# Patient Record
Sex: FEMALE | Race: WHITE | NOT HISPANIC OR LATINO | Employment: FULL TIME | URBAN - METROPOLITAN AREA
[De-identification: names, ages, dates, MRNs, and addresses within clinical notes are randomized per-mention and may not be internally consistent; named-entity substitution may affect disease eponyms.]

---

## 2021-10-29 ENCOUNTER — TELEPHONE (OUTPATIENT)
Dept: SCHEDULING | Facility: IMAGING CENTER | Age: 46
End: 2021-10-29

## 2021-11-04 ENCOUNTER — OFFICE VISIT (OUTPATIENT)
Dept: MEDICAL GROUP | Facility: LAB | Age: 46
End: 2021-11-04
Payer: COMMERCIAL

## 2021-11-04 VITALS
OXYGEN SATURATION: 98 % | DIASTOLIC BLOOD PRESSURE: 68 MMHG | RESPIRATION RATE: 12 BRPM | HEIGHT: 71 IN | SYSTOLIC BLOOD PRESSURE: 108 MMHG | BODY MASS INDEX: 27.3 KG/M2 | TEMPERATURE: 97.1 F | WEIGHT: 195 LBS | HEART RATE: 77 BPM

## 2021-11-04 DIAGNOSIS — M54.50 LUMBAR PAIN: ICD-10-CM

## 2021-11-04 DIAGNOSIS — Z12.31 ENCOUNTER FOR SCREENING MAMMOGRAM FOR MALIGNANT NEOPLASM OF BREAST: ICD-10-CM

## 2021-11-04 DIAGNOSIS — E55.9 VITAMIN D DEFICIENCY: ICD-10-CM

## 2021-11-04 DIAGNOSIS — Z23 NEED FOR VACCINATION: ICD-10-CM

## 2021-11-04 DIAGNOSIS — Z13.6 ENCOUNTER FOR SCREENING FOR CARDIOVASCULAR DISORDERS: ICD-10-CM

## 2021-11-04 DIAGNOSIS — J31.0 CHRONIC RHINITIS: ICD-10-CM

## 2021-11-04 PROBLEM — J30.1 SEASONAL ALLERGIC RHINITIS DUE TO POLLEN: Status: ACTIVE | Noted: 2019-03-28

## 2021-11-04 PROCEDURE — 99204 OFFICE O/P NEW MOD 45 MIN: CPT | Mod: 25 | Performed by: FAMILY MEDICINE

## 2021-11-04 PROCEDURE — 90471 IMMUNIZATION ADMIN: CPT | Performed by: FAMILY MEDICINE

## 2021-11-04 PROCEDURE — 90686 IIV4 VACC NO PRSV 0.5 ML IM: CPT | Performed by: FAMILY MEDICINE

## 2021-11-04 RX ORDER — CETIRIZINE HYDROCHLORIDE 10 MG/1
10 TABLET ORAL DAILY
COMMUNITY
End: 2022-06-14

## 2021-11-04 RX ORDER — FLUTICASONE PROPIONATE 50 MCG
1 SPRAY, SUSPENSION (ML) NASAL DAILY
Qty: 16 G | Refills: 3 | Status: SHIPPED | OUTPATIENT
Start: 2021-11-04 | End: 2022-06-14

## 2021-11-04 ASSESSMENT — PATIENT HEALTH QUESTIONNAIRE - PHQ9: CLINICAL INTERPRETATION OF PHQ2 SCORE: 0

## 2021-11-04 NOTE — PROGRESS NOTES
Chief Complaint   Patient presents with   • New Patient     Back pain x years         Stacey Purdy is a 46 y.o. female here to establish care and for evaluation and management of:        HPI:    Back Pain:   Chronic in nature. Lots of history of athletics, volleyball. No specific injury known. Chronic over time.   No PT, no injections done in the past.     Works out regularly. Waist line, left side worse than right. Will have some change in her ADLs due to back pain. More sore after work outs. Not much coming down into the butt or legs.   With activity some cramps on the right calf due to prior achilles rupture.   Hard time getting comfortable to sleep.   No loss of bowel  Or bladder function.   NSAIDS once in awhile. CBD patches at times. Cramps in back stay in the back.     Diet: Whole foods, lower carbs.   Exercise: HIIT workouts, strength, cardio, etc.   H/o vitamin D deficiency.     LMP: very regular, clockwork, Heavier in beginning, then tapers off. Not painful or crampy.   Has been trying to get pregnant. Trying to use an shira for ovulatory cycles.   No h/o STIs in the past.   Last Pap: unknown.       Previous MRI 2019 showed:   1. L5-S1, there is severe disc space loss, asymmetric on the right   with Modic II endplate degenerative change and asymmetric osteophyte   formations. Mild to moderate broad-based disc bulge that impresses on   the anterior thecal sac causing mild spinal narrowing. Mild facet   joint degenerative changes. Moderate to severe right neural foraminal   narrowing with disc osteophyte complex abutment of the exiting L5   nerve root.     2. L4-L5, there is moderate asymmetric disc space loss on the right   with minimal Modic II endplate degenerative change and small endplate   osteophyte formations. Moderate broad-based disc bulge and posterior   endplate osteophytes that impress on the anterior thecal sac causing   mild spinal canal narrowing. Mild facet joint degenerative changes,  "  right more than left. Mild-to-moderate bilateral neural foraminal   narrowing.     3. L3-L4, there is severe disc space loss, asymmetric on the left with   mixed Modic I and II endplate degenerative changes, and asymmetric   osteophyte formations. Moderate broad-based disc bulge that flattens   the anterior thecal sac and narrows the lateral recesses. Mild to   moderate left neural foraminal narrowing. Patent right neural   foramina.     4. Mild reversal of lumbar lordosis and dextroconvex curvature        Not on File    Current medicines (including changes today)  No current outpatient medications on file.     No current facility-administered medications for this visit.     She  has no past medical history on file.  She  has no past surgical history on file.  Social History     Tobacco Use   • Smoking status: Never Smoker   • Smokeless tobacco: Never Used   Vaping Use   • Vaping Use: Never used   Substance Use Topics   • Alcohol use: Not on file     Comment: yes   • Drug use: Yes     Social History     Social History Narrative   • Not on file     No family history on file.  No family status information on file.         ROS  No fever or chills.  No nausea or vomiting.  No chest pain or palpitations.  No cough or SOB.  No pain with urination or hematuria.  No black or bloody stools.  All other systems reviewed and are negative     Objective:     /68 (BP Location: Left arm, Patient Position: Sitting, BP Cuff Size: Adult)   Pulse 77   Temp 36.2 °C (97.1 °F)   Resp 12   Ht 1.803 m (5' 11\")   Wt 88.5 kg (195 lb)   SpO2 98%  Body mass index is 27.2 kg/m².  Physical Exam:      Well developed, well nourished.  Alert, oriented in no acute distress.  Psych: Eye contact is good, speech goal directed, affect calm  Eyes: conjunctiva non-injected, sclera non-icteric.  Ears: Pinna normal. TM pearly gray.   Nose: Nares are patent.  Normal mucosa  Mouth: Oral mucous membranes pink and moist with no lesions.  Neck " Supple.  No adenopathy or masses in the neck or supraclavicular regions. No thyromegaly  Lungs: clear to auscultation bilaterally with good excursion. No wheezes or rhonchi  CV: regular rate and rhythm. No murmur  Abdomen: soft, nontender, no masses or organomegaly.  No rebound or gaurding  Ext: no edema, color normal, vascularity normal, temperature normal  MSK: Lower extremity DTRs are quite brisk but normal and equal bilaterally.  Left-sided paraspinal and SI joint tenderness as well as less mobility of the left SI joint.  Normal forward flexion with some tightness and pain but no radiation to lower extremities.  Negative seated straight leg raise      Assessment and Plan:   The following treatment plan was discussed    1. Lumbar pain  Discussed previous MRI but also current symptoms.  I do not think that she needs to be evaluated at this time for injections or surgery given her high functioning throughout the day.  I do think some physical therapy would be of benefit and she is agreeable to this.  She is referred and we will start with some conservative management for now.    2. Need for vaccination  Flu shot today.  - INFLUENZA VACCINE QUAD INJ (PF)    3. Encounter for screening mammogram for malignant neoplasm of breast  Mammogram ordered.  - MA-SCREENING MAMMO BILAT W/TOMOSYNTHESIS W/CAD; Future    4. Chronic rhinitis  Flonase for chronic rhinitis.  - fluticasone (FLONASE) 50 MCG/ACT nasal spray; Administer 1 Spray into affected nostril(S) every day.  Dispense: 16 g; Refill: 3    5. Encounter for screening for cardiovascular disorders  Routine general screening labs ordered.  We will work on getting records from her previous providers for Pap as well.  - CBC WITH DIFFERENTIAL; Future  - Lipid Profile; Future  - Comp Metabolic Panel; Future  - TSH WITH REFLEX TO FT4; Future    6. Vitamin D deficiency  Reports history of vitamin D deficiency and prescription strength dosing so we will recheck this today.  -  VITAMIN D,25 HYDROXY; Future      Records requested.        Followup: No follow-ups on file.

## 2021-11-04 NOTE — LETTER
[facility]  Malinda Villar M.D.  [address]  [place] [address]  Fax: [phone]   Authorization for Release/Disclosure of   Protected Health Information   Name: STACEY HDEZ : 1975 SSN: [id number]   Address: [place] [address] Phone:    [phone] (home)    I authorize the entity listed below to release/disclose the PHI below to:   [facility]/Malinda Villar M.D. and Malinda Villar M.D.   Provider or Entity Name:  [facility]   Address   City, State, Zip   Phone:      Fax:     Reason for request: continuity of care   Information to be released:    [  ] LAST COLONOSCOPY,  including any PATH REPORT and follow-up  [  ] LAST FIT/COLOGUARD RESULT [  ] LAST DEXA  [  ] LAST MAMMOGRAM  [  ] LAST PAP  [  ] LAST LABS [  ] RETINA EXAM REPORT  [  ] IMMUNIZATION RECORDS  [x  ] Release all info      [  ] Check here and initial the line next to each item to release ALL health information INCLUDING  __x___ Care and treatment for drug and / or alcohol abuse  __x___ HIV testing, infection status, or AIDS  __xx___ Genetic Testing    DATES OF SERVICE OR TIME PERIOD TO BE DISCLOSED: _____________  I understand and acknowledge that:  * This Authorization may be revoked at any time by you in writing, except if your health information has already been used or disclosed.  * Your health information that will be used or disclosed as a result of you signing this authorization could be re-disclosed by the recipient. If this occurs, your re-disclosed health information may no longer be protected by State or Federal laws.  * You may refuse to sign this Authorization. Your refusal will not affect your ability to obtain treatment.  * This Authorization becomes effective upon signing and will  on (date) __________.      If no date is indicated, this Authorization will  one (1) year from the signature date.    Name: Stacey Hdez    Signature:   Date:     2021        PLEASE FAX REQUESTED RECORDS BACK TO: [phone]

## 2021-12-10 ENCOUNTER — HOSPITAL ENCOUNTER (OUTPATIENT)
Dept: LAB | Facility: MEDICAL CENTER | Age: 46
End: 2021-12-10
Attending: FAMILY MEDICINE
Payer: COMMERCIAL

## 2021-12-10 DIAGNOSIS — Z13.6 ENCOUNTER FOR SCREENING FOR CARDIOVASCULAR DISORDERS: ICD-10-CM

## 2021-12-10 DIAGNOSIS — E55.9 VITAMIN D DEFICIENCY: ICD-10-CM

## 2021-12-10 LAB
ALBUMIN SERPL BCP-MCNC: 4.6 G/DL (ref 3.2–4.9)
ALBUMIN/GLOB SERPL: 1.6 G/DL
ALP SERPL-CCNC: 61 U/L (ref 30–99)
ALT SERPL-CCNC: 16 U/L (ref 2–50)
ANION GAP SERPL CALC-SCNC: 10 MMOL/L (ref 7–16)
AST SERPL-CCNC: 12 U/L (ref 12–45)
BASOPHILS # BLD AUTO: 0.9 % (ref 0–1.8)
BASOPHILS # BLD: 0.06 K/UL (ref 0–0.12)
BILIRUB SERPL-MCNC: 0.4 MG/DL (ref 0.1–1.5)
BUN SERPL-MCNC: 13 MG/DL (ref 8–22)
CALCIUM SERPL-MCNC: 9.1 MG/DL (ref 8.5–10.5)
CHLORIDE SERPL-SCNC: 104 MMOL/L (ref 96–112)
CHOLEST SERPL-MCNC: 173 MG/DL (ref 100–199)
CO2 SERPL-SCNC: 24 MMOL/L (ref 20–33)
CREAT SERPL-MCNC: 0.77 MG/DL (ref 0.5–1.4)
EOSINOPHIL # BLD AUTO: 0.23 K/UL (ref 0–0.51)
EOSINOPHIL NFR BLD: 3.4 % (ref 0–6.9)
ERYTHROCYTE [DISTWIDTH] IN BLOOD BY AUTOMATED COUNT: 42.8 FL (ref 35.9–50)
FASTING STATUS PATIENT QL REPORTED: NORMAL
GLOBULIN SER CALC-MCNC: 2.8 G/DL (ref 1.9–3.5)
GLUCOSE SERPL-MCNC: 98 MG/DL (ref 65–99)
HCT VFR BLD AUTO: 41.4 % (ref 37–47)
HDLC SERPL-MCNC: 66 MG/DL
HGB BLD-MCNC: 13.5 G/DL (ref 12–16)
IMM GRANULOCYTES # BLD AUTO: 0.02 K/UL (ref 0–0.11)
IMM GRANULOCYTES NFR BLD AUTO: 0.3 % (ref 0–0.9)
LDLC SERPL CALC-MCNC: 93 MG/DL
LYMPHOCYTES # BLD AUTO: 2.13 K/UL (ref 1–4.8)
LYMPHOCYTES NFR BLD: 31.3 % (ref 22–41)
MCH RBC QN AUTO: 29.9 PG (ref 27–33)
MCHC RBC AUTO-ENTMCNC: 32.6 G/DL (ref 33.6–35)
MCV RBC AUTO: 91.8 FL (ref 81.4–97.8)
MONOCYTES # BLD AUTO: 0.48 K/UL (ref 0–0.85)
MONOCYTES NFR BLD AUTO: 7.1 % (ref 0–13.4)
NEUTROPHILS # BLD AUTO: 3.88 K/UL (ref 2–7.15)
NEUTROPHILS NFR BLD: 57 % (ref 44–72)
NRBC # BLD AUTO: 0 K/UL
NRBC BLD-RTO: 0 /100 WBC
PLATELET # BLD AUTO: 245 K/UL (ref 164–446)
PMV BLD AUTO: 13.6 FL (ref 9–12.9)
POTASSIUM SERPL-SCNC: 4 MMOL/L (ref 3.6–5.5)
PROT SERPL-MCNC: 7.4 G/DL (ref 6–8.2)
RBC # BLD AUTO: 4.51 M/UL (ref 4.2–5.4)
SODIUM SERPL-SCNC: 138 MMOL/L (ref 135–145)
TRIGL SERPL-MCNC: 68 MG/DL (ref 0–149)
TSH SERPL DL<=0.005 MIU/L-ACNC: 1.54 UIU/ML (ref 0.38–5.33)
WBC # BLD AUTO: 6.8 K/UL (ref 4.8–10.8)

## 2021-12-10 PROCEDURE — 80053 COMPREHEN METABOLIC PANEL: CPT

## 2021-12-10 PROCEDURE — 84443 ASSAY THYROID STIM HORMONE: CPT

## 2021-12-10 PROCEDURE — 82306 VITAMIN D 25 HYDROXY: CPT

## 2021-12-10 PROCEDURE — 36415 COLL VENOUS BLD VENIPUNCTURE: CPT

## 2021-12-10 PROCEDURE — 85025 COMPLETE CBC W/AUTO DIFF WBC: CPT

## 2021-12-10 PROCEDURE — 80061 LIPID PANEL: CPT

## 2021-12-13 DIAGNOSIS — E55.9 VITAMIN D DEFICIENCY: ICD-10-CM

## 2021-12-13 LAB — 25(OH)D3 SERPL-MCNC: 17 NG/ML (ref 30–80)

## 2021-12-13 RX ORDER — ERGOCALCIFEROL 1.25 MG/1
50000 CAPSULE ORAL
Qty: 12 CAPSULE | Refills: 3 | Status: SHIPPED | OUTPATIENT
Start: 2021-12-13 | End: 2022-11-10

## 2021-12-14 ENCOUNTER — PATIENT MESSAGE (OUTPATIENT)
Dept: MEDICAL GROUP | Facility: LAB | Age: 46
End: 2021-12-14

## 2021-12-14 DIAGNOSIS — Z31.41 FERTILITY TESTING: ICD-10-CM

## 2022-01-11 ENCOUNTER — HOSPITAL ENCOUNTER (OUTPATIENT)
Dept: LAB | Facility: MEDICAL CENTER | Age: 47
End: 2022-01-11
Attending: FAMILY MEDICINE
Payer: COMMERCIAL

## 2022-01-11 ENCOUNTER — OFFICE VISIT (OUTPATIENT)
Dept: MEDICAL GROUP | Facility: LAB | Age: 47
End: 2022-01-11
Payer: COMMERCIAL

## 2022-01-11 VITALS
HEART RATE: 80 BPM | RESPIRATION RATE: 12 BRPM | BODY MASS INDEX: 27.86 KG/M2 | HEIGHT: 71 IN | SYSTOLIC BLOOD PRESSURE: 110 MMHG | DIASTOLIC BLOOD PRESSURE: 80 MMHG | WEIGHT: 199 LBS | OXYGEN SATURATION: 98 % | TEMPERATURE: 97.3 F

## 2022-01-11 DIAGNOSIS — Z32.01 POSITIVE PREGNANCY TEST: ICD-10-CM

## 2022-01-11 DIAGNOSIS — N92.6 IRREGULAR MENSES: ICD-10-CM

## 2022-01-11 LAB
B-HCG SERPL-ACNC: 857 MIU/ML (ref 0–5)
INT CON NEG: ABNORMAL
INT CON POS: ABNORMAL
POC URINE PREGNANCY TEST: POSITIVE

## 2022-01-11 PROCEDURE — 36415 COLL VENOUS BLD VENIPUNCTURE: CPT

## 2022-01-11 PROCEDURE — 84702 CHORIONIC GONADOTROPIN TEST: CPT

## 2022-01-11 PROCEDURE — 99213 OFFICE O/P EST LOW 20 MIN: CPT | Performed by: FAMILY MEDICINE

## 2022-01-11 PROCEDURE — 81025 URINE PREGNANCY TEST: CPT | Performed by: FAMILY MEDICINE

## 2022-01-11 ASSESSMENT — FIBROSIS 4 INDEX: FIB4 SCORE: 0.54

## 2022-01-11 NOTE — PROGRESS NOTES
"Subjective:     Chief Complaint   Patient presents with   • Menorrhagia     prolonged period x 4 weeks         HPI:   Stacey presents today with prolonged period for the last 3-4 weeks. Also took a pregnancy test and was positive.   Has not been phoned yet for fertility consult.   LMP prior to this stuff: 11/18/21, then spotting 12/13/21, then 24th was very heavy bleeding, then very light again, and then today normal flow, which is about a week early. Very red, mucusy. . January 9th home positive pregnancy and again positive in clinic today.           Current Outpatient Medications Ordered in Epic   Medication Sig Dispense Refill   • vitamin D2, Ergocalciferol, (DRISDOL) 1.25 MG (46860 UT) Cap capsule Take 1 Capsule by mouth every 7 days. 12 Capsule 3   • cetirizine (ZYRTEC) 10 MG Tab Take 10 mg by mouth every day.     • fluticasone (FLONASE) 50 MCG/ACT nasal spray Administer 1 Spray into affected nostril(S) every day. 16 g 3     No current [facility]-ordered facility-administered medications on file.         ROS:  Gen: no fevers/chills, no changes in weight  Eyes: no changes in vision  ENT: no sore throat, no hearing loss, no bloody nose  Pulm: no sob, no cough  CV: no chest pain, no palpitations  GI: no nausea/vomiting, no diarrhea  : no dysuria  MSk: no myalgias  Skin: no rash  Neuro: no headaches, no numbness/tingling  Heme/Lymph: no easy bruising      Objective:     Exam:  /80   Pulse 80   Temp 36.3 °C (97.3 °F) (Temporal)   Resp 12   Ht 1.803 m (5' 11\")   Wt 90.3 kg (199 lb)   LMP 12/13/2021   SpO2 98%   BMI 27.75 kg/m²  Body mass index is 27.75 kg/m².    Gen: AAOx3, NAD, well appearing  HEENT: NCAT, EOMI, Nares patent, Mucosa moist  Resp: Normal chest wall rise and fall, not SOB, no tachypnea  Skin: no rash or abnormality of visible skin.   Psych: normal speech, not slurred, good insight, affect full  MSK: Moves all four limbs equally and normally, gait normal      Assessment & Plan:     46 y.o. " female with the following -     1. Irregular menses  Discussed positive pregnancy test in clinic today and very rapidly positive on testing.  We will get some hCG quant's to follow over the next week every 3 days.  If her numbers are going up but she continues to bleed we will have to get some ultrasounds and have her see OB quickly to make sure that she does not continue to bleed for prolonged amount of time.  If her numbers are going down this was probably a pregnancy that has terminated we will need to make sure that she does again stop bleeding at some point here in the near future.  May need a D&C if this continues.  - POCT Pregnancy    2. Positive pregnancy test    - HCG QUANTITATIVE; Standing            No follow-ups on file.    Please note that this dictation was created using voice recognition software. I have made every reasonable attempt to correct obvious errors, but I expect that there are errors of grammar and possibly content that I did not discover before finalizing the note.

## 2022-01-14 ENCOUNTER — HOSPITAL ENCOUNTER (OUTPATIENT)
Dept: LAB | Facility: MEDICAL CENTER | Age: 47
End: 2022-01-14
Attending: FAMILY MEDICINE
Payer: COMMERCIAL

## 2022-01-14 DIAGNOSIS — Z32.01 POSITIVE PREGNANCY TEST: ICD-10-CM

## 2022-01-14 LAB — B-HCG SERPL-ACNC: 773 MIU/ML (ref 0–5)

## 2022-01-14 PROCEDURE — 36415 COLL VENOUS BLD VENIPUNCTURE: CPT

## 2022-01-14 PROCEDURE — 84702 CHORIONIC GONADOTROPIN TEST: CPT

## 2022-05-03 ENCOUNTER — HOSPITAL ENCOUNTER (OUTPATIENT)
Dept: LAB | Facility: MEDICAL CENTER | Age: 47
End: 2022-05-03
Attending: OBSTETRICS & GYNECOLOGY
Payer: COMMERCIAL

## 2022-05-03 LAB
HBV SURFACE AB SERPL IA-ACNC: 5690 MIU/ML (ref 0–10)
HBV SURFACE AG SER QL: NORMAL
HCV AB SER QL: NORMAL
HIV 1+2 AB+HIV1 P24 AG SERPL QL IA: NORMAL
T PALLIDUM AB SER QL IA: NORMAL

## 2022-05-03 PROCEDURE — 86780 TREPONEMA PALLIDUM: CPT

## 2022-05-03 PROCEDURE — 87340 HEPATITIS B SURFACE AG IA: CPT

## 2022-05-03 PROCEDURE — 87491 CHLMYD TRACH DNA AMP PROBE: CPT

## 2022-05-03 PROCEDURE — 87389 HIV-1 AG W/HIV-1&-2 AB AG IA: CPT

## 2022-05-03 PROCEDURE — 87591 N.GONORRHOEAE DNA AMP PROB: CPT

## 2022-05-03 PROCEDURE — 86803 HEPATITIS C AB TEST: CPT

## 2022-05-03 PROCEDURE — 36415 COLL VENOUS BLD VENIPUNCTURE: CPT

## 2022-05-03 PROCEDURE — 86706 HEP B SURFACE ANTIBODY: CPT

## 2022-05-04 LAB
C TRACH DNA SPEC QL NAA+PROBE: NEGATIVE
N GONORRHOEA DNA SPEC QL NAA+PROBE: NEGATIVE
SPECIMEN SOURCE: NORMAL

## 2022-06-14 ENCOUNTER — OFFICE VISIT (OUTPATIENT)
Dept: URGENT CARE | Facility: CLINIC | Age: 47
End: 2022-06-14
Payer: COMMERCIAL

## 2022-06-14 VITALS
WEIGHT: 187 LBS | SYSTOLIC BLOOD PRESSURE: 118 MMHG | HEART RATE: 116 BPM | DIASTOLIC BLOOD PRESSURE: 82 MMHG | TEMPERATURE: 98.5 F | BODY MASS INDEX: 26.18 KG/M2 | RESPIRATION RATE: 18 BRPM | HEIGHT: 71 IN | OXYGEN SATURATION: 99 %

## 2022-06-14 DIAGNOSIS — M47.816 SPONDYLOSIS WITHOUT MYELOPATHY OR RADICULOPATHY, LUMBAR REGION: ICD-10-CM

## 2022-06-14 DIAGNOSIS — M54.50 ACUTE BILATERAL LOW BACK PAIN WITHOUT SCIATICA: ICD-10-CM

## 2022-06-14 PROCEDURE — 99213 OFFICE O/P EST LOW 20 MIN: CPT | Performed by: PHYSICIAN ASSISTANT

## 2022-06-14 RX ORDER — DESOGESTREL AND ETHINYL ESTRADIOL 0.15-0.03
KIT ORAL
COMMUNITY
Start: 2022-04-12 | End: 2022-06-14

## 2022-06-14 RX ORDER — CYCLOBENZAPRINE HCL 10 MG
10 TABLET ORAL 3 TIMES DAILY PRN
Qty: 10 TABLET | Refills: 0 | Status: SHIPPED | OUTPATIENT
Start: 2022-06-14 | End: 2023-10-30

## 2022-06-14 RX ORDER — PREDNISONE 10 MG/1
40 TABLET ORAL 2 TIMES DAILY
Qty: 40 TABLET | Refills: 0 | Status: SHIPPED | OUTPATIENT
Start: 2022-06-14 | End: 2022-06-19

## 2022-06-14 RX ORDER — MELOXICAM 7.5 MG/1
7.5 TABLET ORAL DAILY
Qty: 5 TABLET | Refills: 0 | Status: SHIPPED | OUTPATIENT
Start: 2022-06-14 | End: 2022-06-19

## 2022-06-14 ASSESSMENT — ENCOUNTER SYMPTOMS
GASTROINTESTINAL NEGATIVE: 1
NEUROLOGICAL NEGATIVE: 1
BACK PAIN: 1

## 2022-06-14 ASSESSMENT — FIBROSIS 4 INDEX: FIB4 SCORE: 0.54

## 2022-06-14 NOTE — PROGRESS NOTES
"  Subjective:     Stacey Dupont  is a 46 y.o. female who presents for Back Pain (X4days, Hurts to lay down, hurts to sit, hurts to stand, this is something that has occurred before, left side is the worst, feels most pain at waist level 5/10 pain just standing moving says it can get 10/10 )       She presents today with lumbosacral pain that has been ongoing for the last 4 days.  Symptoms did start after she attempted to lift her garage door.  At this time she denies radicular symptoms, bowel or bladder function loss, changes in strength or neurological sensation over the lower extremities.  She has longstanding history of lumbar pain.  An MRI of the lumbar spine that was taken several years ago in [place] did reveal several degenerative changes at the lower lumbar spine.  She has trialed over-the-counter anti-inflammatory medication without any relief in symptoms.  She is scheduled to follow-up with her primary care in 2 days.     Review of Systems   Gastrointestinal: Negative.    Genitourinary: Negative.    Musculoskeletal: Positive for back pain.   Neurological: Negative.       No Known Allergies  Past Medical History:   Diagnosis Date   • Back pain         Objective:   /82   Pulse (!) 116   Temp 36.9 °C (98.5 °F) (Temporal)   Resp 18   Ht 1.803 m (5' 11\")   Wt 84.8 kg (187 lb)   LMP 06/12/2022 (Exact Date)   SpO2 99%   Breastfeeding No   BMI 26.08 kg/m²   Physical Exam  Vitals and nursing note reviewed.   Constitutional:       General: She is not in acute distress.     Appearance: She is not ill-appearing or toxic-appearing.   HENT:      Head: Normocephalic.      Nose: No rhinorrhea.   Eyes:      General: No scleral icterus.     Conjunctiva/sclera: Conjunctivae normal.   Pulmonary:      Effort: Pulmonary effort is normal. No respiratory distress.      Breath sounds: No stridor.   Musculoskeletal:      Cervical back: Neck supple.      Comments: Tenderness to palpation over the lumbosacral bony " prominences and paraspinal musculature.  Significantly limited trunk range of motion due to pain.  Lower extremity myotome and dermatome exams were within normal limits, bilaterally   Neurological:      Mental Status: She is alert and oriented to person, place, and time.   Psychiatric:         Mood and Affect: Mood normal.         Behavior: Behavior normal.         Thought Content: Thought content normal.         Judgment: Judgment normal.             Diagnostic testing: None    Assessment/Plan:     Encounter Diagnoses   Name Primary?   • Spondylosis without myelopathy or radiculopathy, lumbar region    • Acute bilateral low back pain without sciatica         Plan for care for today's complaint includes Flexeril, Mobic, prednisone.  She should follow-up with her PCP as scheduled in 2 days for further evaluation management of her ongoing low back pain.  If she develops quickly progressing lower extremity weakness, loss of lower extremity sensation, loss of bowel or bladder function then she should return to the emergency department at that time for reevaluation.  Prescription for Flexeril, Mobic, prednisone provided.    See AVS Instructions below for written guidance provided to patient on after-visit management and care in addition to our verbal discussion during the visit.    Please note that this dictation was created using voice recognition software. I have attempted to correct all errors, but there may be sound-alike, spelling, grammar and possibly content errors that I did not discover before finalizing the note.    Dante Bains PA-C

## 2022-06-16 ENCOUNTER — OFFICE VISIT (OUTPATIENT)
Dept: MEDICAL GROUP | Facility: LAB | Age: 47
End: 2022-06-16
Payer: COMMERCIAL

## 2022-06-16 VITALS
WEIGHT: 190 LBS | TEMPERATURE: 97 F | DIASTOLIC BLOOD PRESSURE: 70 MMHG | OXYGEN SATURATION: 98 % | HEART RATE: 77 BPM | RESPIRATION RATE: 12 BRPM | HEIGHT: 71 IN | BODY MASS INDEX: 26.6 KG/M2 | SYSTOLIC BLOOD PRESSURE: 100 MMHG

## 2022-06-16 DIAGNOSIS — M47.816 LUMBAR SPONDYLOSIS: ICD-10-CM

## 2022-06-16 PROCEDURE — 99213 OFFICE O/P EST LOW 20 MIN: CPT | Performed by: FAMILY MEDICINE

## 2022-06-16 ASSESSMENT — FIBROSIS 4 INDEX: FIB4 SCORE: 0.54

## 2022-06-16 ASSESSMENT — PATIENT HEALTH QUESTIONNAIRE - PHQ9: CLINICAL INTERPRETATION OF PHQ2 SCORE: 0

## 2022-06-16 NOTE — PROGRESS NOTES
"Subjective:     Chief Complaint   Patient presents with   • Back Pain     X friday         HPI:   Stacey presents today with flare up of back pain. Went to urgent care and was given flexeril, prednisone, mobic.   Feeling better now, but needs new PT referral and possibly imaging.   Forward flexion causes most pain. Left more than right.       COVID vaccines: Chest reports that she has been completely vaccinated for COVID but all of her vaccinations are in her maiden name.  She wonders if we can certify that she has had these and get her new vaccination card as well.    Current Outpatient Medications Ordered in Epic   Medication Sig Dispense Refill   • predniSONE (DELTASONE) 10 MG Tab Take 4 Tablets by mouth 2 times a day for 5 days. 40 Tablet 0   • meloxicam (MOBIC) 7.5 MG Tab Take 1 Tablet by mouth every day for 5 days. 5 Tablet 0   • cyclobenzaprine (FLEXERIL) 10 mg Tab Take 1 Tablet by mouth 3 times a day as needed for Muscle Spasms. 10 Tablet 0   • vitamin D2, Ergocalciferol, (DRISDOL) 1.25 MG (58265 UT) Cap capsule Take 1 Capsule by mouth every 7 days. 12 Capsule 3     No current [facility]-ordered facility-administered medications on file.         ROS:  Gen: no fevers/chills, no changes in weight  Eyes: no changes in vision  ENT: no sore throat, no hearing loss, no bloody nose  Pulm: no sob, no cough  CV: no chest pain, no palpitations  GI: no nausea/vomiting, no diarrhea  : no dysuria  MSk: no myalgias  Skin: no rash  Neuro: no headaches, no numbness/tingling  Heme/Lymph: no easy bruising      Objective:     Exam:  /70 (BP Location: Left arm, Patient Position: Sitting, BP Cuff Size: Adult)   Pulse 77   Temp 36.1 °C (97 °F)   Resp 12   Ht 1.803 m (5' 11\")   Wt 86.2 kg (190 lb)   LMP 06/12/2022 (Exact Date)   SpO2 98%   BMI 26.50 kg/m²  Body mass index is 26.5 kg/m².    Gen: AAOx3, NAD, well appearing  HEENT: NCAT, EOMI, Nares patent, Mucosa moist  Resp: Normal chest wall rise and fall, not SOB, no " tachypnea  Skin: no rash or abnormality of visible skin.   Psych: normal speech, not slurred, good insight, affect full  MSK: Moves all four limbs equally and normally, gait normal.  She is moving very gingerly in general from the exam table to standing.  She has pain at the L3-4 area particular on the left side.  There is no SI joint tenderness appreciated today.  Very poor forward flexion range of motion likely due to apprehension.  Does have some mild paraspinal hypertonicity bilaterally in the lumbar spine.  Forward flexion causes a rightward lean and some mild rotation as well.  DTRs are equal and normal bilaterally to lower extremities.        Assessment & Plan:     46 y.o. female with the following -   1. Lumbar spondylosis  Reviewed MRI from previous records in 2019 through [facility].  Extensive lower lumbar spine spondylosis particularly worse at 3-4,4-5, 5-1.  Since she is not having any radicular symptoms at this time I think we can continue to work with physical therapy to see how much we can strengthen her core and her paraspinals to see much pain relief she gets.  We did discuss possibility of repeating MRI in the future depending upon how her therapy goes.  If she continues to not have any pain relief or continues to have multiple flareups even without radicular symptoms I think it be worth it given the findings on her previous MRI to repeat this.    With regard to her COVID vaccination we can certify that she has had this done by getting her old card (with her new information and getting her new vaccination record from her chart.  - Referral to Physical Therapy            No follow-ups on file.    Please note that this dictation was created using voice recognition software. I have made every reasonable attempt to correct obvious errors, but I expect that there are errors of grammar and possibly content that I did not discover before finalizing the note.

## 2022-06-27 ENCOUNTER — TELEPHONE (OUTPATIENT)
Dept: MEDICAL GROUP | Facility: LAB | Age: 47
End: 2022-06-27
Payer: COMMERCIAL

## 2022-06-27 NOTE — TELEPHONE ENCOUNTER
"· Physical Therapy paperwork received from [facility] requiring provider signature.     · All appropriate fields completed by Medical Assistant: N/A CMA printed and distributed to MA    · Paperwork placed in \"MA to Provider\" folder/basket. Awaiting provider completion/signature.  "

## 2022-09-09 ENCOUNTER — TELEPHONE (OUTPATIENT)
Dept: MEDICAL GROUP | Facility: LAB | Age: 47
End: 2022-09-09
Payer: COMMERCIAL

## 2022-09-09 NOTE — TELEPHONE ENCOUNTER
"Physical Therapy paperwork received from [facility] requiring provider signature.     All appropriate fields completed by Medical Assistant: N/A CMA printed and distributed to MA    Paperwork placed in \"MA to Provider\" folder/basket. Awaiting provider completion/signature.    "

## 2022-10-14 ENCOUNTER — TELEPHONE (OUTPATIENT)
Dept: MEDICAL GROUP | Facility: LAB | Age: 47
End: 2022-10-14
Payer: COMMERCIAL

## 2022-11-09 DIAGNOSIS — E55.9 VITAMIN D DEFICIENCY: ICD-10-CM

## 2022-11-09 NOTE — TELEPHONE ENCOUNTER
Received request via: Pharmacy    Was the patient seen in the last year in this department? Yes  6/16/22  Does the patient have an active prescription (recently filled or refills available) for medication(s) requested? No

## 2022-11-10 RX ORDER — ERGOCALCIFEROL 1.25 MG/1
CAPSULE ORAL
Qty: 12 CAPSULE | Refills: 3 | Status: SHIPPED | OUTPATIENT
Start: 2022-11-10 | End: 2023-10-25

## 2022-11-17 ENCOUNTER — OFFICE VISIT (OUTPATIENT)
Dept: URGENT CARE | Facility: CLINIC | Age: 47
End: 2022-11-17
Payer: COMMERCIAL

## 2022-11-17 ENCOUNTER — PATIENT MESSAGE (OUTPATIENT)
Dept: MEDICAL GROUP | Facility: LAB | Age: 47
End: 2022-11-17

## 2022-11-17 ENCOUNTER — HOSPITAL ENCOUNTER (EMERGENCY)
Facility: MEDICAL CENTER | Age: 47
End: 2022-11-17
Attending: EMERGENCY MEDICINE
Payer: COMMERCIAL

## 2022-11-17 VITALS
WEIGHT: 190 LBS | RESPIRATION RATE: 20 BRPM | SYSTOLIC BLOOD PRESSURE: 104 MMHG | HEIGHT: 71 IN | OXYGEN SATURATION: 97 % | HEART RATE: 96 BPM | TEMPERATURE: 97.6 F | BODY MASS INDEX: 26.6 KG/M2 | DIASTOLIC BLOOD PRESSURE: 88 MMHG

## 2022-11-17 VITALS
BODY MASS INDEX: 27.53 KG/M2 | TEMPERATURE: 98 F | RESPIRATION RATE: 16 BRPM | OXYGEN SATURATION: 98 % | DIASTOLIC BLOOD PRESSURE: 89 MMHG | WEIGHT: 196.65 LBS | HEART RATE: 89 BPM | HEIGHT: 71 IN | SYSTOLIC BLOOD PRESSURE: 121 MMHG

## 2022-11-17 DIAGNOSIS — M54.50 LUMBAR BACK PAIN: ICD-10-CM

## 2022-11-17 DIAGNOSIS — M47.816 LUMBAR SPONDYLOSIS: ICD-10-CM

## 2022-11-17 DIAGNOSIS — M54.50 ACUTE BILATERAL LOW BACK PAIN WITHOUT SCIATICA: ICD-10-CM

## 2022-11-17 LAB — HCG SERPL QL: NEGATIVE

## 2022-11-17 PROCEDURE — 99213 OFFICE O/P EST LOW 20 MIN: CPT | Performed by: NURSE PRACTITIONER

## 2022-11-17 PROCEDURE — 700102 HCHG RX REV CODE 250 W/ 637 OVERRIDE(OP): Performed by: EMERGENCY MEDICINE

## 2022-11-17 PROCEDURE — 84703 CHORIONIC GONADOTROPIN ASSAY: CPT

## 2022-11-17 PROCEDURE — 99284 EMERGENCY DEPT VISIT MOD MDM: CPT

## 2022-11-17 PROCEDURE — 96374 THER/PROPH/DIAG INJ IV PUSH: CPT

## 2022-11-17 PROCEDURE — 36415 COLL VENOUS BLD VENIPUNCTURE: CPT

## 2022-11-17 PROCEDURE — 96375 TX/PRO/DX INJ NEW DRUG ADDON: CPT

## 2022-11-17 PROCEDURE — A9270 NON-COVERED ITEM OR SERVICE: HCPCS | Performed by: EMERGENCY MEDICINE

## 2022-11-17 PROCEDURE — 700111 HCHG RX REV CODE 636 W/ 250 OVERRIDE (IP): Performed by: EMERGENCY MEDICINE

## 2022-11-17 RX ORDER — LIDOCAINE 50 MG/G
1 PATCH TOPICAL
Qty: 10 PATCH | Refills: 1 | Status: SHIPPED | OUTPATIENT
Start: 2022-11-17 | End: 2023-10-30

## 2022-11-17 RX ORDER — ONDANSETRON 2 MG/ML
4 INJECTION INTRAMUSCULAR; INTRAVENOUS ONCE
Status: COMPLETED | OUTPATIENT
Start: 2022-11-17 | End: 2022-11-17

## 2022-11-17 RX ORDER — KETOROLAC TROMETHAMINE 30 MG/ML
30 INJECTION, SOLUTION INTRAMUSCULAR; INTRAVENOUS ONCE
Status: COMPLETED | OUTPATIENT
Start: 2022-11-17 | End: 2022-11-17

## 2022-11-17 RX ORDER — MORPHINE SULFATE 4 MG/ML
4 INJECTION INTRAVENOUS ONCE
Status: COMPLETED | OUTPATIENT
Start: 2022-11-17 | End: 2022-11-17

## 2022-11-17 RX ORDER — HYDROCODONE BITARTRATE AND ACETAMINOPHEN 5; 325 MG/1; MG/1
1 TABLET ORAL EVERY 6 HOURS PRN
Qty: 15 TABLET | Refills: 0 | Status: SHIPPED | OUTPATIENT
Start: 2022-11-17 | End: 2022-11-22

## 2022-11-17 RX ORDER — HYDROCODONE BITARTRATE AND ACETAMINOPHEN 5; 325 MG/1; MG/1
1 TABLET ORAL ONCE
Status: COMPLETED | OUTPATIENT
Start: 2022-11-17 | End: 2022-11-17

## 2022-11-17 RX ORDER — CYCLOBENZAPRINE HCL 10 MG
10 TABLET ORAL 3 TIMES DAILY PRN
Qty: 30 TABLET | Refills: 0 | Status: SHIPPED | OUTPATIENT
Start: 2022-11-17 | End: 2023-10-30

## 2022-11-17 RX ORDER — METHYLPREDNISOLONE 4 MG/1
TABLET ORAL
Qty: 1 EACH | Refills: 0 | Status: SHIPPED | OUTPATIENT
Start: 2022-11-17 | End: 2023-10-30

## 2022-11-17 RX ADMIN — MORPHINE SULFATE 4 MG: 4 INJECTION INTRAVENOUS at 14:21

## 2022-11-17 RX ADMIN — KETOROLAC TROMETHAMINE 30 MG: 30 INJECTION, SOLUTION INTRAMUSCULAR; INTRAVENOUS at 14:23

## 2022-11-17 RX ADMIN — ONDANSETRON 4 MG: 2 INJECTION INTRAMUSCULAR; INTRAVENOUS at 14:21

## 2022-11-17 RX ADMIN — HYDROCODONE BITARTRATE AND ACETAMINOPHEN 1 TABLET: 5; 325 TABLET ORAL at 16:03

## 2022-11-17 ASSESSMENT — LIFESTYLE VARIABLES
HAVE YOU EVER FELT YOU SHOULD CUT DOWN ON YOUR DRINKING: NO
CONSUMPTION TOTAL: NEGATIVE
TOTAL SCORE: 0
AVERAGE NUMBER OF DAYS PER WEEK YOU HAVE A DRINK CONTAINING ALCOHOL: 1
ON A TYPICAL DAY WHEN YOU DRINK ALCOHOL HOW MANY DRINKS DO YOU HAVE: 1
TOTAL SCORE: 0
HAVE PEOPLE ANNOYED YOU BY CRITICIZING YOUR DRINKING: NO
DO YOU DRINK ALCOHOL: YES
TOTAL SCORE: 0
EVER FELT BAD OR GUILTY ABOUT YOUR DRINKING: NO
HOW MANY TIMES IN THE PAST YEAR HAVE YOU HAD 5 OR MORE DRINKS IN A DAY: 0
EVER HAD A DRINK FIRST THING IN THE MORNING TO STEADY YOUR NERVES TO GET RID OF A HANGOVER: NO

## 2022-11-17 ASSESSMENT — FIBROSIS 4 INDEX
FIB4 SCORE: 0.55
FIB4 SCORE: 0.55

## 2022-11-17 ASSESSMENT — PAIN DESCRIPTION - PAIN TYPE: TYPE: ACUTE PAIN

## 2022-11-17 NOTE — ED TRIAGE NOTES
Chief Complaint   Patient presents with    Back Pain     Low back pain since March, she has been gong to PT nothing is helping, she feels things are worse.

## 2022-11-17 NOTE — ED TRIAGE NOTES
Patient presents to triage in a wheelchair, low back pain, bilateral lower extremity numbness and tingling for one week.

## 2022-11-17 NOTE — PROGRESS NOTES
"Patient has consented to treatment and for use of patient information for treatment and billing purposes.    Date: 11/17/22     Arrival Mode: Private Vehicle    Chief Complaint:    Chief Complaint   Patient presents with    Back Pain     Started last night, \"Lower back pain/spasms . I have degenerate disc disease. \"        History of Present Illness: 47 y.o.  female presents to clinic with reported severe low back pain that started last night.  Patient does have a long history of low back pain although states this is the worst episode she has had.  States she is unable to ambulate. Patient was unable to get out of bed unassisted this am. Patient has taken a dose of prednisone from a previous episode, flexeril, aleve and a topical NSAID path.  Patient report numbness and tingling bilateral lower back radiating down into her legs.  Patient did have an episode of diarrhea, no bloody or black diarrhea.     Patient denies nausea vomiting.  Denies burning with urination flank pain or abdominal pain. Denies cough, shortness of breath or lower leg swelling. Patient denies direct trauma, new urinary retention, fecal incontinence , no history of cancer , IV drug use, fevers, or recent surgical procedures. Patient denies ripping sensation in back or history of AAA, coldness or color change in  extremity.     ROS:    As stated in HPI     Pertinent Medical History:  Past Medical History:   Diagnosis Date    Back pain         Pertinent Surgical History:  Past Surgical History:   Procedure Laterality Date    APPENDECTOMY  2007    ACHILLES TENDON REPAIR          Pertinent Medications:    Current Outpatient Medications on File Prior to Visit   Medication Sig Dispense Refill    vitamin D2, Ergocalciferol, (DRISDOL) 1.25 MG (78745 UT) Cap capsule TAKE 1 CAPSULE BY MOUTH ONE TIME PER WEEK 12 Capsule 3    cyclobenzaprine (FLEXERIL) 10 mg Tab Take 1 Tablet by mouth 3 times a day as needed for Muscle Spasms. 10 Tablet 0     No current " facility-administered medications on file prior to visit.        Allergies:    Patient has no known allergies.     Social History:  Social History     Tobacco Use    Smoking status: Never    Smokeless tobacco: Never   Vaping Use    Vaping Use: Never used   Substance Use Topics    Alcohol use: Not Currently     Alcohol/week: 1.2 oz     Types: 2 Glasses of wine per week     Comment: yes    Drug use: Not Currently     Types: Oral     Comment: topical as well         Patient's last menstrual period was 10/19/2022 (exact date).           Physical Exam:    Vitals:    11/17/22 1005   BP: 104/88   Pulse: 96   Resp: 20   Temp: 36.4 °C (97.6 °F)   SpO2: 97%             Physical Exam  Constitutional:       Appearance: She is ill-appearing (pale,  agrees).      Comments: Patient is standing upon me entering the exam room as she is switching her gait from 1 foot to the other.  States she is unable to sit or lay down.   HENT:      Head: Normocephalic and atraumatic.   Eyes:      General: Lids are normal.      Pupils: Pupils are equal, round, and reactive to light.   Musculoskeletal:      Lumbar back: Spasms, tenderness and bony tenderness (no bridget tenderness or step off) present. Decreased range of motion.      Comments: Exam is limited due to pain.    Skin:     General: Skin is warm.      Capillary Refill: Capillary refill takes less than 2 seconds.      Coloration: Skin is pale. Skin is not cyanotic.   Neurological:      Mental Status: She is alert and oriented to person, place, and time.      Gait: Gait abnormal (antalgic).      Comments: Exam limited due to pain   Psychiatric:         Behavior: Behavior is cooperative.          Diagnostics:  None     Medical Decision making and clinic course :  I personally reviewed prior external notes and test results pertinent to today's visit.   Shared decision-making was utilized with patient for treatment plan.  Due to pain exam is limited.  Concerns with new numbness and  tingling progressive and bilaterally, progressive worsening pain, usual measures not improving as well as being unable to ambulate or get out of bed on her own.  Unfortunately, unable to give in clinic Toradol as patient has already taken NSAIDs.  Due to worsening symptoms recommend the patient 6 higher level care for pain management and possible further diagnostic work-up.      The patient remained stable during the urgent care visit.    Plan:         1. Acute bilateral low back pain without sciatica      Disposition:    Higher level of care via private care driven by .       Voice Recognition Disclaimer:  Portions of this document were created using voice recognition software. The software does have a chance of producing errors of grammar and possibly content. I have made every reasonable attempt to correct obvious errors, but there may be errors of grammar and possibly content that I did not discover before finalizing the documentation.    Kristen Taylor, [email or web address].

## 2022-11-17 NOTE — ED PROVIDER NOTES
"ED Provider Note    CHIEF COMPLAINT  Chief Complaint   Patient presents with    Back Pain     Low back pain since March, she has been gong to PT nothing is helping, she feels things are worse.       HPI  Stacey Dupont is a 47 y.o. female who presents complaining of low back pain.    Patient states she has been having low back pain since March.  She believes that pulling a garage door open may have started her back pain issues.  She has been going to physical therapy and generally been doing well.  She states Flexeril typically helps her pain.  However, at 11 PM last night she developed worsening/\"extreme\" low back pain with spasm radiating into the bilateral calf area.  Patient reports spasms on the right calf typically but now has been on the left as well.  The pain increases with movement.  She feels like her pain is a sensation of \"locking.\"  She took Aleve at 3 AM, applied Voltaren at 5 AM, Flexeril at 9 AM, and prednisone 40 mg from leftover prednisone in the past without relief.    Patient had an MRI in October 2019 which demonstrated disc issues.    Patient denies weakness, incontinence, urinary symptoms, saddle anesthesia, fever, chills, trauma.      ALLERGIES  Allergies   Allergen Reactions    Cats Claw, Uncaria Tomentosa        CURRENT MEDICATIONS  Home Medications       Reviewed by Laina Lentz R.N. (Registered Nurse) on 11/17/22 at 1141  Med List Status: Not Addressed     Medication Last Dose Status   cyclobenzaprine (FLEXERIL) 10 mg Tab  Active   vitamin D2, Ergocalciferol, (DRISDOL) 1.25 MG (19036 UT) Cap capsule  Active                    PAST MEDICAL HISTORY   has a past medical history of Back pain.    SURGICAL HISTORY   has a past surgical history that includes achilles tendon repair and appendectomy (2007).    SOCIAL HISTORY  Social History     Tobacco Use    Smoking status: Never    Smokeless tobacco: Never   Vaping Use    Vaping Use: Never used   Substance and Sexual Activity    Alcohol use: " "Not Currently     Alcohol/week: 1.2 oz     Types: 2 Glasses of wine per week     Comment: yes    Drug use: Not Currently     Types: Oral     Comment: topical as well     Sexual activity: Yes     Partners: Male     Comment: tryign to get pregnant       Patient here with her male significant other  Patient is a former [employer]   Denies IV drug use    REVIEW OF SYSTEMS  See HPI for further details.  All other systems are negative except as above in HPI.    PHYSICAL EXAM  VITAL SIGNS: BP (!) 123/95   Pulse 97   Temp 36.2 °C (97.2 °F) (Temporal)   Resp 16   Ht 1.803 m (5' 11\")   Wt 89.2 kg (196 lb 10.4 oz)   LMP 10/19/2022 (Exact Date)   SpO2 100%   BMI 27.43 kg/m²     General:  WDWN female, nontoxic but appearing uncomfortable, sitting without movement on the side of the stretcher; A+Ox3; V/S as above  Skin: warm and dry; good color; no rash  HEENT: NCAT; EOMs intact; PERRL; no scleral icterus   Neck: FROM; spasm in the bilateral upper paraspinal region;  Cardiovascular: Regular heart rate and rhythm.  No murmurs, rubs, or gallops; pulses 2+ bilaterally radially  Lungs: Clear to auscultation with good air movement bilaterally.  No wheezes, rhonchi, or rales.   Abdomen: BS present; soft; NTND; no rebound, guarding, or rigidity.  No organomegaly or pulsatile mass; no CVAT  Back: No midline back tenderness, discoloration, or mottling; negative straight leg raise bilaterally  Extremities: DIAMOND x 4; no e/o trauma; no pedal edema; neg Woodrow's  Neurologic: CNs III-XII grossly intact; speech clear; distal sensation intact; strength 5/5 UE/LEs; DTRs 2+ bilaterally in patellar areas  Psychiatric: Appropriate affect, normal mood    LABS  Results for orders placed or performed during the hospital encounter of 11/17/22   BETA-HCG QUALITATIVE SERUM   Result Value Ref Range    Beta-Hcg Qualitative Serum Negative Negative       MEDICAL RECORD  I have reviewed patient's medical record and pertinent results " are listed below.      COURSE & MEDICAL DECISION MAKING  I have reviewed any medical record information, laboratory studies and radiographic results as noted.    Stacey Dupont is a 47 y.o. female who presents complaining of bilateral low back pain radiating into her calves.  No focal neurologic deficits are reported.  She does have some tingling in her calves but no weakness, saddle anesthesia, or incontinence.  No trauma is reported.  Last MRI in 2019.  Patient appears uncomfortable and will require some parenteral pain medication.  She has been trying to get pregnant so we obtained an hCG which was negative.  Toradol and morphine with Zofran were ordered.    I do not feel she requires a stat MRI as I do not suspect epidural abscess or cauda equina syndrome.  Other imaging such as CT or x-ray would not be helpful as there has been no trauma.  Labs other than the pregnancy test were not indicated.  I do not suspect osteomyelitis or pyelonephritis.    Appropriate PPE was worn at all times while interacting with the patient.    Pt was re-evaluated at 4:00 PM  Patient's pain is improved.  She appears more comfortable, now able to extend her legs out on the stretcher without holding onto the armrests.  We discussed the plan and they are amenable to going home.  I will prescribe Flexeril, Norco, Lidoderm patches, and a Medrol Dosepak.  They are in close contact with the patient's PCP who can order an MRI as an outpatient if needed.  Apparently, there is been a referral to a spine/pain doctor by the PCP.       I have reviewed the patient's narcotic Rx record.  No prior prescriptions for controlled substances were noted.      IMPRESSION  1. Lumbar back pain  cyclobenzaprine (FLEXERIL) 10 mg Tab    HYDROcodone-acetaminophen (NORCO) 5-325 MG Tab per tablet    lidocaine (LIDODERM) 5 % Patch    methylPREDNISolone (MEDROL DOSEPAK) 4 MG Tablet Therapy Pack               Electronically signed by: Jeannette Schwarz M.D.,  11/17/2022 12:14 PM

## 2022-11-18 NOTE — DISCHARGE INSTRUCTIONS
Apply Lidoderm patches, take Tylenol 650 mg every 4 hours, ibuprofen 600 mg every 6 hours with food as needed for pain.      Take Norco as needed for more severe pain that is not controlled by the above measures.    You are being prescribed a narcotic. Narcotics are addictive. Please take the narcotic sparingly and only as needed for pain. Do not drink alcohol, operate heavy machinery, or drive while taking a narcotic as it may impair your judgment and motor skills. If the narcotic contains acetaminophen, you should not take other acetaminophen-containing products, such as Tylenol, while taking this medication as it may affect your liver.  Also, taking a stool softener while taking narcotics is advised as they cause constipation.      Return to the ER for incontinence, fever, weakness, or other concerns.

## 2022-12-01 ENCOUNTER — OFFICE VISIT (OUTPATIENT)
Dept: PHYSICAL MEDICINE AND REHAB | Facility: MEDICAL CENTER | Age: 47
End: 2022-12-01
Payer: COMMERCIAL

## 2022-12-01 VITALS
SYSTOLIC BLOOD PRESSURE: 128 MMHG | WEIGHT: 196.87 LBS | OXYGEN SATURATION: 94 % | DIASTOLIC BLOOD PRESSURE: 92 MMHG | BODY MASS INDEX: 27.56 KG/M2 | HEIGHT: 71 IN | HEART RATE: 90 BPM | TEMPERATURE: 97.3 F

## 2022-12-01 DIAGNOSIS — G89.29 OTHER CHRONIC PAIN: ICD-10-CM

## 2022-12-01 DIAGNOSIS — M48.061 NEUROFORAMINAL STENOSIS OF LUMBAR SPINE: ICD-10-CM

## 2022-12-01 DIAGNOSIS — M54.50 CHRONIC BILATERAL LOW BACK PAIN WITHOUT SCIATICA: Primary | ICD-10-CM

## 2022-12-01 DIAGNOSIS — G89.29 CHRONIC BILATERAL LOW BACK PAIN WITHOUT SCIATICA: Primary | ICD-10-CM

## 2022-12-01 DIAGNOSIS — M47.816 LUMBAR SPONDYLOSIS: ICD-10-CM

## 2022-12-01 DIAGNOSIS — Z13.31 POSITIVE DEPRESSION SCREENING: ICD-10-CM

## 2022-12-01 PROCEDURE — 99204 OFFICE O/P NEW MOD 45 MIN: CPT | Performed by: STUDENT IN AN ORGANIZED HEALTH CARE EDUCATION/TRAINING PROGRAM

## 2022-12-01 RX ORDER — ACETAMINOPHEN 325 MG/1
325 TABLET ORAL EVERY 4 HOURS PRN
COMMUNITY
End: 2023-10-30

## 2022-12-01 ASSESSMENT — PAIN SCALES - GENERAL: PAINLEVEL: 3=SLIGHT PAIN

## 2022-12-01 ASSESSMENT — PATIENT HEALTH QUESTIONNAIRE - PHQ9
5. POOR APPETITE OR OVEREATING: 1 - SEVERAL DAYS
SUM OF ALL RESPONSES TO PHQ QUESTIONS 1-9: 11
CLINICAL INTERPRETATION OF PHQ2 SCORE: 2

## 2022-12-01 ASSESSMENT — FIBROSIS 4 INDEX: FIB4 SCORE: 0.55

## 2022-12-01 NOTE — PROGRESS NOTES
New Patient Note    Interventional Pain and Spine  Physiatry (Physical Medicine and Rehabilitation)     Patient Name: Stacey Dupont  : 1975  Date of Service: 2022  PCP: Malinda Rose M.D.  Referring Provider: Kaiden Rose*    Chief Complaint:   Chief Complaint   Patient presents with    New Patient     Lumbar spondylosis       HPI  HISTORY (2022):  Stacey Dupont is a 47 y.o. female who presents today with multiple years of low back pain that started with no known inciting event. Occasionally she has extreme flares of pain worse with lying down.  Originally she was only having these epoisdes a few times per year but they have been becoming more frequent, now every other month. Her most recent episode of extreme pain was 2 weeks ago and she had radiating pain to her bilateral groin and some tingling in her right thigh. She gets spasms that interfere with her ability to move.  She is unable to identify a cause of her episodes.    Pain right now is 3/10 on the numeric pain scale. Her pain at its best-worse level during the course of the day is typically 4-7/10, respectively.  Pain worsens with  transitioning to lying supine and extending her legs  and improves with  being active . Her pain interferes somewhat with ADLs. The patient endorsed transient sensation of tingling at her right thigh when she had her recent severe exacerbation of low back pain.  She denies focal weakness or numbness or tingling elsewhere.     She reports the more active she is the better she feels. But PT interferes with ability to be active.     The patient is currently doing physical therapy for this problem, but notes this is worsening her pain. Has done PT 2 days per week since 2022 with no imprvoement. Goes to [facility] in [place]    Patient has tried the following medications with varied success (current meds in bold):   - would like to avoid meds if possible  - medrol dose  latrice - no improvement  - flexeril PRN less than daily- some improvement, SE sleepiness    Therapeutic modalities and interventional therapies to date include:  -no injections    Medical history includes MI 27.    Psychological testing for pain as depression and pain commonly coexist and need to both be treated.     Opioid Risk Score: 1      Interpretation of Opioid Risk Score   Score 0-3 = Low risk of abuse. Do UDS at least once per year.  Score 4-7 = Moderate risk of abuse. Do UDS 1-4 times per year.  Score 8+ = High risk of abuse. Refer to specialist.    PHQ      11/4/2021     9:00 AM 6/16/2022     2:00 PM 12/1/2022     9:40 AM   Depression Screen (PHQ-2/PHQ-9)   PHQ-2 Total Score 0 0 2   PHQ-9 Total Score   11       Interpretation of PHQ-9 Total Score   Score Severity   1-4 No Depression   5-9 Mild Depression   10-14 Moderate Depression   15-19 Moderately Severe Depression   20-27 Severe Depression      Medical records review:  I reviewed the notes from the ED and family care dated 11/17/22    ROS:   Red Flags ROS:   Fever, Chills, Sweats: Denies  Involuntary Weight Loss: Denies  Bladder Incontinence: Denies  Bowel Incontinence: denies  Saddle Anesthesia: Denies    All other systems reviewed and negative.     PMHx:   Past Medical History:   Diagnosis Date    Back pain        PSHx:   Past Surgical History:   Procedure Laterality Date    APPENDECTOMY  2007    ACHILLES TENDON REPAIR         Family Hx:   Family History   Problem Relation Age of Onset    Thyroid Mother     Heart Disease Father         CABG x 5    Diabetes Father         prediabetes    Heart Disease Paternal Grandfather     Diabetes Paternal Grandfather     Stroke Paternal Grandfather     Cancer Neg Hx        Social Hx:  Social History     Socioeconomic History    Marital status:      Spouse name: Not on file    Number of children: Not on file    Years of education: Not on file    Highest education level: Not on file   Occupational History    Not  on file   Tobacco Use    Smoking status: Never    Smokeless tobacco: Never   Vaping Use    Vaping Use: Never used   Substance and Sexual Activity    Alcohol use: Yes     Alcohol/week: 1.2 oz     Types: 2 Glasses of wine per week    Drug use: Not Currently     Types: Oral     Comment: topical as well     Sexual activity: Yes     Partners: Male     Comment: tryign to get pregnant   Other Topics Concern    Not on file   Social History Narrative    Not on file     Social Determinants of Health     Financial Resource Strain: Not on file   Food Insecurity: Not on file   Transportation Needs: Not on file   Physical Activity: Not on file   Stress: Not on file   Social Connections: Not on file   Intimate Partner Violence: Not on file   Housing Stability: Not on file       Allergies:  Allergies   Allergen Reactions    Cat Hair Extract        Medications: reviewed on epic.   Outpatient Medications Marked as Taking for the 12/1/22 encounter (Office Visit) with Mona Rossi M.D.   Medication Sig Dispense Refill    acetaminophen (TYLENOL) 325 MG Tab Take 325 mg by mouth every four hours as needed.      cyclobenzaprine (FLEXERIL) 10 mg Tab Take 1 Tablet by mouth 3 times a day as needed for Moderate Pain or Muscle Spasms. 30 Tablet 0    lidocaine (LIDODERM) 5 % Patch Place 1 Patch on the skin every 24 hours as needed (pain). 10 Patch 1    vitamin D2, Ergocalciferol, (DRISDOL) 1.25 MG (41186 UT) Cap capsule TAKE 1 CAPSULE BY MOUTH ONE TIME PER WEEK 12 Capsule 3    cyclobenzaprine (FLEXERIL) 10 mg Tab Take 1 Tablet by mouth 3 times a day as needed for Muscle Spasms. 10 Tablet 0        Current Outpatient Medications on File Prior to Visit   Medication Sig Dispense Refill    acetaminophen (TYLENOL) 325 MG Tab Take 325 mg by mouth every four hours as needed.      cyclobenzaprine (FLEXERIL) 10 mg Tab Take 1 Tablet by mouth 3 times a day as needed for Moderate Pain or Muscle Spasms. 30 Tablet 0    lidocaine (LIDODERM) 5 % Patch Place 1  "Patch on the skin every 24 hours as needed (pain). 10 Patch 1    vitamin D2, Ergocalciferol, (DRISDOL) 1.25 MG (08362 UT) Cap capsule TAKE 1 CAPSULE BY MOUTH ONE TIME PER WEEK 12 Capsule 3    cyclobenzaprine (FLEXERIL) 10 mg Tab Take 1 Tablet by mouth 3 times a day as needed for Muscle Spasms. 10 Tablet 0    methylPREDNISolone (MEDROL DOSEPAK) 4 MG Tablet Therapy Pack Use as directed (Patient not taking: Reported on 12/1/2022) 1 Each 0     No current facility-administered medications on file prior to visit.         EXAMINATION     Physical Exam:   BP (!) 128/92 (BP Location: Right arm, Patient Position: Sitting, BP Cuff Size: Adult)   Pulse 90   Temp 36.3 °C (97.3 °F) (Temporal)   Ht 1.803 m (5' 11\")   Wt 89.3 kg (196 lb 13.9 oz)   SpO2 94%     Constitutional:   Body Habitus: Body mass index is 27.46 kg/m².  Cooperation: Fully cooperates with exam  Appearance: Well-groomed, well-nourished.    Eyes: No scleral icterus to suggest severe liver disease, no proptosis to suggest severe hyperthyroidism    ENT -no obvious auditory deficits, no noticeable facial droop     Skin -no rashes or lesions noted     Respiratory-  breathing comfortably on room air, no audible wheezing    Cardiovascular-distal extremities warm and well perfused.  No lower extremity edema is noted.     Gastrointestinal - no obvious abdominal masses, non-distended    Psychiatric- alert and oriented ×3. Normal affect.     Gait - normal gait, no use of ambulatory device, nonantalgic. Heel walking and toe walking intact.    Musculoskeletal and Neuro -     Thoracic/Lumbar Spine/Sacral Spine/Hips   Inspection: No evidence of atrophy in bilateral lower extremities throughout     There is full active range of motion with lumbar extension    Facet loading maneuver positive bilaterally    Palpation:   Tenderness to palpation over the paraspinal muscles bilaterally, lumbar facets bilaterally spanning approximately L1-L5 levels, and sacroiliac joints " bilaterally.     Lumbar spine /hip provocative exam maneuvers  Straight leg raise negative bilaterally  Slump-sit test negative bilaterally  FADIR test negative bilaterally    SI joint tests  JA test negative bilaterally  Thigh thrust test negative bilaterally  SI joint compression negative bilaterally  SI joint distraction negative bilaterally  Sacral thrust test negative bilaterally  Yeomans maneuver positive on right, negative on left    Key points for the international standards for neurological classification of spinal cord injury (ISNCSCI) to light touch.   Dermatome R L   L2 2 2   L3 2 2   L4 2 2   L5 2 2   S1 2 2   S2 2 2       Motor Exam Lower Extremities  ? Myotome R L   Hip flexion L2 5 5   Knee extension L3 5 5   Ankle dorsiflexion L4 5 5   Toe extension L5 5 5   Ankle plantarflexion S1 5 5       Reflexes  ?  R L   Patella  3+ 3+   Achilles   2+ 2+     Clonus of the ankle negative bilaterally       MEDICAL DECISION MAKING    Medical records review: see under HPI section.     DATA    Labs: Personally reviewed at today's visit:   Lab Results   Component Value Date/Time    SODIUM 138 12/10/2021 07:09 AM    POTASSIUM 4.0 12/10/2021 07:09 AM    CHLORIDE 104 12/10/2021 07:09 AM    CO2 24 12/10/2021 07:09 AM    ANION 10.0 12/10/2021 07:09 AM    GLUCOSE 98 12/10/2021 07:09 AM    BUN 13 12/10/2021 07:09 AM    CREATININE 0.77 12/10/2021 07:09 AM    CALCIUM 9.1 12/10/2021 07:09 AM    ASTSGOT 12 12/10/2021 07:09 AM    ALTSGPT 16 12/10/2021 07:09 AM    TBILIRUBIN 0.4 12/10/2021 07:09 AM    ALBUMIN 4.6 12/10/2021 07:09 AM    TOTPROTEIN 7.4 12/10/2021 07:09 AM    GLOBULIN 2.8 12/10/2021 07:09 AM    AGRATIO 1.6 12/10/2021 07:09 AM       No results found for: PROTHROMBTM, INR     Lab Results   Component Value Date/Time    WBC 6.8 12/10/2021 07:09 AM    RBC 4.51 12/10/2021 07:09 AM    HEMOGLOBIN 13.5 12/10/2021 07:09 AM    HEMATOCRIT 41.4 12/10/2021 07:09 AM    MCV 91.8 12/10/2021 07:09 AM    MCH 29.9 12/10/2021 07:09  AM    MCHC 32.6 (L) 12/10/2021 07:09 AM    MPV 13.6 (H) 12/10/2021 07:09 AM    NEUTSPOLYS 57.00 12/10/2021 07:09 AM    LYMPHOCYTES 31.30 12/10/2021 07:09 AM    MONOCYTES 7.10 12/10/2021 07:09 AM    EOSINOPHILS 3.40 12/10/2021 07:09 AM    BASOPHILS 0.90 12/10/2021 07:09 AM        No results found for: HBA1C     Imaging:   I personally reviewed following images, these are my reads  No pertinent imaging available for review at the time of today's visit        IMAGING radiology reads. I reviewed the following radiology reads   MRI lumbar spine 10/14/19  FINDINGS: There is mild reversal of the lumbar lordosis. There is   dextroconvex scoliosis, poorly evaluated on the current examination.   The vertebral body heights are maintained. Conus medullaris ends at   the L1-2 disc space. The visualized portions of the lower spinal cord   are normal in caliber and signal intensity. Multilevel small   perineural cysts noted in the lower thoracic/upper lumbar region.   Paravertebral and paraspinal soft tissues are unremarkable.     L1-L2: Maintained disc space. No spinal canal stenosis or neural   foraminal narrowing.     L2-L3: Maintained disc space. No spinal canal stenosis or neural   foraminal narrowing.     L3-L4: Severe disc space loss, asymmetric on the left with mixed Modic   I and II endplate degenerative changes, and asymmetric osteophyte   formations. Moderate broad-based disc bulge that flattens the anterior   thecal sac and narrows the lateral recesses. Mild to moderate left   neural foraminal narrowing. Patent right neural foramina.     L4-L5: Moderate asymmetric disc space loss on the right with minimal   Modic II endplate degenerative change and small endplate osteophyte   formations. Moderate broad-based disc bulge and posterior endplate   osteophytes that impress on the anterior thecal sac causing mild   spinal canal narrowing. Mild facet joint degenerative changes, right   more than left. Mild-to-moderate  bilateral neural foraminal narrowing.     L5-S1: Severe disc space loss, asymmetric on the right with Modic II   endplate degenerative change and asymmetric osteophyte formations.   Mild to moderate broad-based disc bulge that impresses on the anterior   thecal sac causing mild spinal narrowing. Mild facet joint   degenerative changes. Moderate to severe right neural foraminal   narrowing with disc osteophyte complex abutment of the exiting L5   nerve root.                  Diagnosis  Visit Diagnoses     ICD-10-CM   1. Chronic bilateral low back pain without sciatica  M54.50    G89.29   2. Other chronic pain  G89.29   3. Positive depression screening  Z13.31   4. Lumbar spondylosis  M47.816   5. Neuroforaminal stenosis of lumbar spine  M48.061         ASSESSMENT AND PLAN:  Stacey Dupont ( 1975) is a female presenting with worsening low back pain, with her worst area of pain at bilateral lower lumbar facets (L>R).  Occasional tingling in her right thigh    Also with pain of bilateral sacroiliac joints, not reproduced with 3 provocative exam maneuvers today.    Stacey was seen today for new patient.    Diagnoses and all orders for this visit:    Chronic bilateral low back pain without sciatica  -     MR-LUMBAR SPINE-W/O; Future    Other chronic pain    Positive depression screening  -     Patient has been identified as having a positive depression screening. Appropriate orders and counseling have been given.    Lumbar spondylosis  -     MR-LUMBAR SPINE-W/O; Future    Neuroforaminal stenosis of lumbar spine  -     MR-LUMBAR SPINE-W/O; Future      PLAN  Physical Therapy: Patient has done extensive, 6 months of physical therapy without improvement in symptoms    Diagnostic workup: as above. Personally reviewed at today's visit: Reports of MRI lumbar spine 10/14/19    Medications:   -Discussed possible trial of a different muscle relaxant such as Robaxin which may have more tolerable side effects than Flexeril.   She elected to defer medication changes at this time and she would like to avoid medications    Interventions:   Pending MRI review.     Follow-up after MRI for review    Orders Placed This Encounter    MR-LUMBAR SPINE-W/O    Patient has been identified as having a positive depression screening. Appropriate orders and counseling have been given.    acetaminophen (TYLENOL) 325 MG Tab       Mona Rossi MD  Interventional Pain and Spine  Physical Medicine and Rehabilitation  [facility]    The above note documents my personal evaluation of this patient. In addition, I have reviewed and confirmed with the patient and MA the supportive information documented in today's Patient Health Questionnaire and Office Note.     Please note that this dictation was created using voice recognition software. I have made every reasonable attempt to correct obvious errors, but I expect that there are errors of grammar and possibly content that I did not discover before finalizing the note.

## 2022-12-15 ENCOUNTER — APPOINTMENT (OUTPATIENT)
Dept: RADIOLOGY | Facility: MEDICAL CENTER | Age: 47
End: 2022-12-15
Attending: STUDENT IN AN ORGANIZED HEALTH CARE EDUCATION/TRAINING PROGRAM
Payer: COMMERCIAL

## 2022-12-15 DIAGNOSIS — M47.816 LUMBAR SPONDYLOSIS: ICD-10-CM

## 2022-12-15 DIAGNOSIS — M54.50 CHRONIC BILATERAL LOW BACK PAIN WITHOUT SCIATICA: ICD-10-CM

## 2022-12-15 DIAGNOSIS — M48.061 NEUROFORAMINAL STENOSIS OF LUMBAR SPINE: ICD-10-CM

## 2022-12-15 DIAGNOSIS — G89.29 CHRONIC BILATERAL LOW BACK PAIN WITHOUT SCIATICA: ICD-10-CM

## 2022-12-15 PROCEDURE — 72148 MRI LUMBAR SPINE W/O DYE: CPT

## 2022-12-19 ENCOUNTER — OFFICE VISIT (OUTPATIENT)
Dept: PHYSICAL MEDICINE AND REHAB | Facility: MEDICAL CENTER | Age: 47
End: 2022-12-19
Payer: COMMERCIAL

## 2022-12-19 VITALS
WEIGHT: 199.3 LBS | SYSTOLIC BLOOD PRESSURE: 128 MMHG | HEIGHT: 71 IN | OXYGEN SATURATION: 97 % | HEART RATE: 67 BPM | DIASTOLIC BLOOD PRESSURE: 88 MMHG | TEMPERATURE: 96.7 F | BODY MASS INDEX: 27.9 KG/M2

## 2022-12-19 DIAGNOSIS — M48.061 NEUROFORAMINAL STENOSIS OF LUMBAR SPINE: ICD-10-CM

## 2022-12-19 DIAGNOSIS — G89.29 CHRONIC BILATERAL LOW BACK PAIN WITHOUT SCIATICA: Primary | ICD-10-CM

## 2022-12-19 DIAGNOSIS — G89.29 OTHER CHRONIC PAIN: ICD-10-CM

## 2022-12-19 DIAGNOSIS — M54.50 CHRONIC BILATERAL LOW BACK PAIN WITHOUT SCIATICA: Primary | ICD-10-CM

## 2022-12-19 DIAGNOSIS — M79.10 MYALGIA: ICD-10-CM

## 2022-12-19 DIAGNOSIS — M47.816 LUMBAR SPONDYLOSIS: ICD-10-CM

## 2022-12-19 PROCEDURE — 99214 OFFICE O/P EST MOD 30 MIN: CPT | Performed by: STUDENT IN AN ORGANIZED HEALTH CARE EDUCATION/TRAINING PROGRAM

## 2022-12-19 ASSESSMENT — PAIN SCALES - GENERAL: PAINLEVEL: 4=SLIGHT-MODERATE PAIN

## 2022-12-19 ASSESSMENT — FIBROSIS 4 INDEX: FIB4 SCORE: 0.55

## 2022-12-19 ASSESSMENT — PATIENT HEALTH QUESTIONNAIRE - PHQ9: CLINICAL INTERPRETATION OF PHQ2 SCORE: 0

## 2022-12-19 NOTE — PROGRESS NOTES
Follow-up patient Note    Interventional Pain and Spine  Physiatry (Physical Medicine and Rehabilitation)     Patient Name: Stacey Dupont  : 1975  Date of service: 2022    Chief Complaint:   Chief Complaint   Patient presents with    Follow-Up     Chronic bilateral low back pain without sciatica       HISTORY  Please see new patient note by Dr. Rossi for more details.     HPI  Today's visit   Stacey Dupont ( 1975) is a female with The primary encounter diagnosis was Chronic bilateral low back pain without sciatica. Diagnoses of Other chronic pain, Myalgia, Lumbar spondylosis, and Neuroforaminal stenosis of lumbar spine were also pertinent to this visit.    Presents today for MRI review. Notes ongoing pain at her bilateral low back and upper glutes (L>R) but does not radiate.  Notes significant muscle spasms when she transitions to lying supine.  Over time this will resolve since she relaxes her muscles.  This is her worst area of pain.  She notes that she has not had radiating pain or tingling in her leg since her last visit, and she has had only one episode of this overall.    Started taking ibuprofen 2 tabs daily with improvement in muscle spasms.     Pain severity 3-4/10 currently  Pt denies new numbness, tingling, or weakness.      ROS:   Red Flags ROS:   Fever, Chills, Sweats: Denies  Involuntary Weight Loss: Denies  Bladder Incontinence: Denies  Bowel Incontinence: denies  Saddle Anesthesia: Denies    All other systems reviewed and negative.     PMHx:   Past Medical History:   Diagnosis Date    Back pain        PSHx:   Past Surgical History:   Procedure Laterality Date    APPENDECTOMY  2007    ACHILLES TENDON REPAIR         Family Hx:   Family History   Problem Relation Age of Onset    Thyroid Mother     Heart Disease Father         CABG x 5    Diabetes Father         prediabetes    Heart Disease Paternal Grandfather     Diabetes Paternal Grandfather     Stroke Paternal Grandfather      Cancer Neg Hx        Social Hx:  Social History     Socioeconomic History    Marital status:      Spouse name: Not on file    Number of children: Not on file    Years of education: Not on file    Highest education level: Not on file   Occupational History    Not on file   Tobacco Use    Smoking status: Never    Smokeless tobacco: Never   Vaping Use    Vaping Use: Never used   Substance and Sexual Activity    Alcohol use: Yes     Alcohol/week: 1.2 oz     Types: 2 Glasses of wine per week    Drug use: Not Currently     Types: Oral     Comment: topical as well     Sexual activity: Yes     Partners: Male     Comment: tryign to get pregnant   Other Topics Concern    Not on file   Social History Narrative    Not on file     Social Determinants of Health     Financial Resource Strain: Not on file   Food Insecurity: Not on file   Transportation Needs: Not on file   Physical Activity: Not on file   Stress: Not on file   Social Connections: Not on file   Intimate Partner Violence: Not on file   Housing Stability: Not on file       Allergies:  Allergies   Allergen Reactions    Cat Hair Extract        Medications: reviewed on epic.   Outpatient Medications Marked as Taking for the 12/19/22 encounter (Office Visit) with Mona Rossi M.D.   Medication Sig Dispense Refill    acetaminophen (TYLENOL) 325 MG Tab Take 325 mg by mouth every four hours as needed.      cyclobenzaprine (FLEXERIL) 10 mg Tab Take 1 Tablet by mouth 3 times a day as needed for Moderate Pain or Muscle Spasms. 30 Tablet 0    lidocaine (LIDODERM) 5 % Patch Place 1 Patch on the skin every 24 hours as needed (pain). 10 Patch 1    vitamin D2, Ergocalciferol, (DRISDOL) 1.25 MG (72007 UT) Cap capsule TAKE 1 CAPSULE BY MOUTH ONE TIME PER WEEK 12 Capsule 3    cyclobenzaprine (FLEXERIL) 10 mg Tab Take 1 Tablet by mouth 3 times a day as needed for Muscle Spasms. 10 Tablet 0        Current Outpatient Medications on File Prior to Visit   Medication Sig Dispense  "Refill    acetaminophen (TYLENOL) 325 MG Tab Take 325 mg by mouth every four hours as needed.      cyclobenzaprine (FLEXERIL) 10 mg Tab Take 1 Tablet by mouth 3 times a day as needed for Moderate Pain or Muscle Spasms. 30 Tablet 0    lidocaine (LIDODERM) 5 % Patch Place 1 Patch on the skin every 24 hours as needed (pain). 10 Patch 1    vitamin D2, Ergocalciferol, (DRISDOL) 1.25 MG (99992 UT) Cap capsule TAKE 1 CAPSULE BY MOUTH ONE TIME PER WEEK 12 Capsule 3    cyclobenzaprine (FLEXERIL) 10 mg Tab Take 1 Tablet by mouth 3 times a day as needed for Muscle Spasms. 10 Tablet 0    methylPREDNISolone (MEDROL DOSEPAK) 4 MG Tablet Therapy Pack Use as directed (Patient not taking: Reported on 12/1/2022) 1 Each 0     No current facility-administered medications on file prior to visit.         EXAMINATION     Physical Exam:   /88 (BP Location: Right arm, Patient Position: Sitting, BP Cuff Size: Adult)   Pulse 67   Temp 35.9 °C (96.7 °F) (Temporal)   Ht 1.803 m (5' 11\")   Wt 90.4 kg (199 lb 4.7 oz)   SpO2 97%     Constitutional:   Body Habitus: Body mass index is 27.8 kg/m².  Cooperation: Fully cooperates with exam  Appearance: Well-groomed, well-nourished.    Eyes: No scleral icterus to suggest severe liver disease, no proptosis to suggest severe hyperthyroidism    ENT -no obvious auditory deficits, no noticeable facial droop     Skin -no rashes or lesions noted     Respiratory-  breathing comfortably on room air, no audible wheezing    Cardiovascular-distal extremities warm and well perfused.  No lower extremity edema is noted.     Gastrointestinal - no obvious abdominal masses, non-distended    Psychiatric- alert and oriented ×3. Normal affect.     Gait - normal gait, no use of ambulatory device, nonantalgic. Heel walking and toe walking intact.     Musculoskeletal and Neuro -      Thoracic/Lumbar Spine/Sacral Spine/Hips   Inspection: No evidence of atrophy in bilateral lower extremities throughout      There is " full active range of motion with lumbar extension     Facet loading maneuver positive bilaterally for ipsilateral upper glute pain     Palpation:   Tenderness to palpation over the paraspinal muscles bilaterally, lumbar facets bilaterally spanning approximately L1-L5 levels, and sacroiliac joints bilaterally.      Lumbar spine /hip provocative exam maneuvers  Straight leg raise negative bilaterally    FADIR test negative bilaterally     SI joint tests  JA test negative bilaterally  Thigh thrust test negative bilaterally       Key points for the international standards for neurological classification of spinal cord injury (ISNCSCI) to light touch.   Dermatome R L   L2 2 2   L3 2 2   L4 2 2   L5 2 2   S1 2 2   S2 2 2         Motor Exam Lower Extremities  ? Myotome R L   Hip flexion L2 5 5   Knee extension L3 5 5   Ankle dorsiflexion L4 5 5   Toe extension L5 5 5   Ankle plantarflexion S1 5 5      Previous exam  Slump-sit test negative bilaterally  SI joint compression negative bilaterally  SI joint distraction negative bilaterally  Sacral thrust test negative bilaterally  Yeomans maneuver positive on right, negative on left    Reflexes  ?   R L   Patella   3+ 3+   Achilles    2+ 2+      Clonus of the ankle negative bilaterally          MEDICAL DECISION MAKING    Medical records review: see under HPI section.     DATA    Labs: No new labs available for review since last visit.   Lab Results   Component Value Date/Time    SODIUM 138 12/10/2021 07:09 AM    POTASSIUM 4.0 12/10/2021 07:09 AM    CHLORIDE 104 12/10/2021 07:09 AM    CO2 24 12/10/2021 07:09 AM    ANION 10.0 12/10/2021 07:09 AM    GLUCOSE 98 12/10/2021 07:09 AM    BUN 13 12/10/2021 07:09 AM    CREATININE 0.77 12/10/2021 07:09 AM    CALCIUM 9.1 12/10/2021 07:09 AM    ASTSGOT 12 12/10/2021 07:09 AM    ALTSGPT 16 12/10/2021 07:09 AM    TBILIRUBIN 0.4 12/10/2021 07:09 AM    ALBUMIN 4.6 12/10/2021 07:09 AM    TOTPROTEIN 7.4 12/10/2021 07:09 AM    GLOBULIN 2.8  12/10/2021 07:09 AM    AGRATIO 1.6 12/10/2021 07:09 AM       No results found for: PROTHROMBTM, INR     Lab Results   Component Value Date/Time    WBC 6.8 12/10/2021 07:09 AM    RBC 4.51 12/10/2021 07:09 AM    HEMOGLOBIN 13.5 12/10/2021 07:09 AM    HEMATOCRIT 41.4 12/10/2021 07:09 AM    MCV 91.8 12/10/2021 07:09 AM    MCH 29.9 12/10/2021 07:09 AM    MCHC 32.6 (L) 12/10/2021 07:09 AM    MPV 13.6 (H) 12/10/2021 07:09 AM    NEUTSPOLYS 57.00 12/10/2021 07:09 AM    LYMPHOCYTES 31.30 12/10/2021 07:09 AM    MONOCYTES 7.10 12/10/2021 07:09 AM    EOSINOPHILS 3.40 12/10/2021 07:09 AM    BASOPHILS 0.90 12/10/2021 07:09 AM        No results found for: HBA1C     Imaging:   I personally reviewed following images, these are my reads  MRI lumbar spine 12/15/22  Disc bulge at L3-4, L4-5, and L5-S1.  Moderate right neuroforaminal stenosis at L5-S1.  Very minimal bilateral neuroforaminal stenosis at L4-5 and very minimal neuroforaminal stenosis at right L3-4.  No significant central canal stenosis.  Mild facet arthropathy most notable at bilateral L4-5.  Very minimal facet arthropathy at bilateral L5-S1 and L3-4. See formal radiology report for further details.    IMAGING radiology reads. I reviewed the following radiology reads                      Results for orders placed in visit on 12/15/22    MR-LUMBAR SPINE-W/O    Impression  Mild degenerative changes in the lumbar spine. Type I marrow changes are noted along the adjacent L3-4, L4-5 levels.    Moderate right foraminal narrowing at L5-S1 with slight impingement upon the exiting right L5 nerve.    MRI lumbar spine 10/14/19  FINDINGS: There is mild reversal of the lumbar lordosis. There is   dextroconvex scoliosis, poorly evaluated on the current examination.   The vertebral body heights are maintained. Conus medullaris ends at   the L1-2 disc space. The visualized portions of the lower spinal cord   are normal in caliber and signal intensity. Multilevel small   perineural cysts  noted in the lower thoracic/upper lumbar region.   Paravertebral and paraspinal soft tissues are unremarkable.     L1-L2: Maintained disc space. No spinal canal stenosis or neural   foraminal narrowing.     L2-L3: Maintained disc space. No spinal canal stenosis or neural   foraminal narrowing.     L3-L4: Severe disc space loss, asymmetric on the left with mixed Modic   I and II endplate degenerative changes, and asymmetric osteophyte   formations. Moderate broad-based disc bulge that flattens the anterior   thecal sac and narrows the lateral recesses. Mild to moderate left   neural foraminal narrowing. Patent right neural foramina.     L4-L5: Moderate asymmetric disc space loss on the right with minimal   Modic II endplate degenerative change and small endplate osteophyte   formations. Moderate broad-based disc bulge and posterior endplate   osteophytes that impress on the anterior thecal sac causing mild   spinal canal narrowing. Mild facet joint degenerative changes, right   more than left. Mild-to-moderate bilateral neural foraminal narrowing.     L5-S1: Severe disc space loss, asymmetric on the right with Modic II   endplate degenerative change and asymmetric osteophyte formations.   Mild to moderate broad-based disc bulge that impresses on the anterior   thecal sac causing mild spinal narrowing. Mild facet joint   degenerative changes. Moderate to severe right neural foraminal   narrowing with disc osteophyte complex abutment of the exiting L5   nerve root.                                                                            Diagnosis  Visit Diagnoses     ICD-10-CM   1. Chronic bilateral low back pain without sciatica  M54.50    G89.29   2. Other chronic pain  G89.29   3. Myalgia  M79.10   4. Lumbar spondylosis  M47.816   5. Neuroforaminal stenosis of lumbar spine  M48.061         ASSESSMENT AND PLAN:  Stacey Dupont (: 1975) is a female presenting with worsening low back pain, with her worst  area of pain at bilateral lower lumbar facets (L>R).  Has had 1 episode of tingling in her right thigh but no significant radiating pain, numbness, or tingling in her bilateral lower extremities recently.     Also with pain of bilateral sacroiliac joints, not reproduced with 3 provocative exam maneuvers.     Stacey was seen today for follow-up.    Diagnoses and all orders for this visit:    Chronic bilateral low back pain without sciatica  -     Referral to Physical Therapy    Other chronic pain  -     Referral to Physical Therapy    Myalgia  -     Referral to Physical Therapy  -     Referral to Pain Clinic    Lumbar spondylosis  -     Referral to Physical Therapy    Neuroforaminal stenosis of lumbar spine        PLAN  Physical Therapy: Patient has done extensive 6 months of physical therapy without improvement in symptoms.  However it sounds like this physical therapy may not have been a good fit for her.  We referred her to a different physical therapy practice today.     Diagnostic workup: Personally reviewed at today's visit: MRI lumbar spine 12/15/22     Medications:   -I have discussed possible trial of a different muscle relaxant such as Robaxin which may have more tolerable side effects than Flexeril.  She elected to defer medication changes at this time and she would like to avoid medications     Interventions:   - Trigger point injections under ultrasound guidance including bilateral low back area. The risks, benefits, and alternatives to this procedure were discussed and the patient wishes to proceed with the procedure. Risks include but are not limited to damage to surrounding structures, infection, bleeding, worsening of pain which can be permanent. Benefits include pain relief and improved function. Alternatives include not doing the procedure.  -Discussed that at this time her primary etiology of pain does not appear to be facetogenic in etiology especially given only the minimal facet arthropathy seen  on her MRI lumbar spine.  Discussed that also given her relatively young age, I would be hesitant to consider radiofrequency ablation at this time.  Additionally this type of procedure would require radiation and she would like to avoid radiation at this time that she may try to conceive.  Discussed that if her pain changes and worsens in the future and appears facetogenic, we could consider diagnostic lumbar medial branch blocks with possible progression to radiofrequency ablation at that time.  -Discussed that if she develops significant radiating pain down her legs in the future, could consider an epidural if it correlates with areas of neuroforaminal stenosis on her imaging    Other  -Discussed that she should try deep tissue massage techniques at home    Follow-up: In January for trigger point injections.  Discussed that doing trigger point injections around the time of doing physical therapy will give her a higher chance of improvement    Orders Placed This Encounter    Referral to Physical Therapy    Referral to Pain Clinic       Mona Rossi MD  Interventional Pain and Spine  Physical Medicine and Rehabilitation  [facility]      The above note documents my personal evaluation of this patient. In addition, I have reviewed and confirmed with the patient and MA the supportive information documented in today's Patient Health Questionnaire and Office Note.     Please note that this dictation was created using voice recognition software. I have made every reasonable attempt to correct obvious errors, but I expect that there are errors of grammar and possibly content that I did not discover before finalizing the note.

## 2023-01-24 ENCOUNTER — OFFICE VISIT (OUTPATIENT)
Dept: PHYSICAL MEDICINE AND REHAB | Facility: MEDICAL CENTER | Age: 48
End: 2023-01-24
Payer: COMMERCIAL

## 2023-01-24 DIAGNOSIS — Z91.199 NO-SHOW FOR APPOINTMENT: ICD-10-CM

## 2023-01-24 PROCEDURE — 99999 PR NO CHARGE: CPT | Performed by: STUDENT IN AN ORGANIZED HEALTH CARE EDUCATION/TRAINING PROGRAM

## 2023-01-25 NOTE — PROGRESS NOTES
Late cancellation to scheduled appointment due to patient illness, patient reports she has COVID.

## 2023-01-31 ENCOUNTER — HOSPITAL ENCOUNTER (OUTPATIENT)
Dept: LAB | Facility: MEDICAL CENTER | Age: 48
End: 2023-01-31
Attending: OBSTETRICS & GYNECOLOGY
Payer: COMMERCIAL

## 2023-01-31 ENCOUNTER — OFFICE VISIT (OUTPATIENT)
Dept: PHYSICAL MEDICINE AND REHAB | Facility: MEDICAL CENTER | Age: 48
End: 2023-01-31
Payer: COMMERCIAL

## 2023-01-31 VITALS
OXYGEN SATURATION: 97 % | HEART RATE: 80 BPM | WEIGHT: 195.99 LBS | TEMPERATURE: 97.1 F | DIASTOLIC BLOOD PRESSURE: 68 MMHG | HEIGHT: 71 IN | BODY MASS INDEX: 27.44 KG/M2 | SYSTOLIC BLOOD PRESSURE: 110 MMHG

## 2023-01-31 DIAGNOSIS — M79.10 MYALGIA: ICD-10-CM

## 2023-01-31 LAB
PROLACTIN SERPL-MCNC: 13.1 NG/ML (ref 2.8–26)
TSH SERPL DL<=0.005 MIU/L-ACNC: 1.82 UIU/ML (ref 0.38–5.33)

## 2023-01-31 PROCEDURE — 76942 ECHO GUIDE FOR BIOPSY: CPT | Performed by: STUDENT IN AN ORGANIZED HEALTH CARE EDUCATION/TRAINING PROGRAM

## 2023-01-31 PROCEDURE — 84443 ASSAY THYROID STIM HORMONE: CPT

## 2023-01-31 PROCEDURE — 36415 COLL VENOUS BLD VENIPUNCTURE: CPT

## 2023-01-31 PROCEDURE — 84146 ASSAY OF PROLACTIN: CPT

## 2023-01-31 PROCEDURE — 20552 NJX 1/MLT TRIGGER POINT 1/2: CPT | Performed by: STUDENT IN AN ORGANIZED HEALTH CARE EDUCATION/TRAINING PROGRAM

## 2023-01-31 ASSESSMENT — FIBROSIS 4 INDEX: FIB4 SCORE: 0.55

## 2023-01-31 ASSESSMENT — PATIENT HEALTH QUESTIONNAIRE - PHQ9: CLINICAL INTERPRETATION OF PHQ2 SCORE: 0

## 2023-01-31 NOTE — PROCEDURES
Patient Name: Stacey Dupont  : 1975  Date of Service: 2023    Physician/s: Mona Rossi MD    Pre-operative Diagnosis: Myalgia (M79.1)    Post-operative Diagnosis: Myalgia (M79.1)    Procedure: trigger point injections of the following muscles:    Site R L   Splenius capitis     Splenius cervicis     Sternocleidomastoid     Rhomboids     Levator scapulae     Pectoralis minor     Pectoralis major     Serratus anterior     Teres major/minor     Quadratus lumborum     Paravertebral, cervical     Paravertebral, thoracic     Paravertebral, lumbar  x   Gluteus jesus  x   Gluteus medius     Gluteus minimus     Tensor fascia mic     Vastus lateralis     Adductor jasmyn     Adductor longus     Occipitalis     Cervical paraspinal     Trapezius, upper     Trapezius, mid     Trapezius, lower     Latissimus dorsi       Description of procedure:    The risks, benefits, and alternatives of the procedure were reviewed and discussed with the patient.  Written informed consent was freely obtained. A pre-procedural time-out was conducted by the physician verifying patient’s identity, procedure to be performed, procedure site and side, and allergy verification. Appropriate equipment was determined to be in place for the procedure.     In the office suite exam room the patient was placed in a prone position and the skin areas for injection over the above muscles were marked. A total of 4 areas of pain were identified for injection. The areas of pain were then prepped and draped in the usual sterile fashion. A solution was prepared with 10 mL of 1% lidocaine. Ultrasound was confirmed to view the adjacent structures for blood vessels and nerves and to confirm the needle path was not within the structures. A 27g needle was placed into each of the markings at the areas above under ultrasound guidance with an out of plane approach.. After negative aspiration, approximately 2.5 mL of the above solution was injected. The  needle was removed intact after each trigger point injection, and the patient's back was covered with a 4x4 gauze, the area was cleansed with sterile normal saline, and a dressing was applied. There were no complications noted. The images were uploaded to our media tab for permanent storage.    Mona Rossi MD  Interventional Pain and Spine  Physical Medicine and Rehabilitation  [facility]

## 2023-04-13 ENCOUNTER — APPOINTMENT (OUTPATIENT)
Dept: PHYSICAL MEDICINE AND REHAB | Facility: MEDICAL CENTER | Age: 48
End: 2023-04-13
Payer: COMMERCIAL

## 2023-06-28 ENCOUNTER — HOSPITAL ENCOUNTER (OUTPATIENT)
Dept: LAB | Facility: MEDICAL CENTER | Age: 48
End: 2023-06-28
Attending: OBSTETRICS & GYNECOLOGY
Payer: COMMERCIAL

## 2023-06-28 PROCEDURE — 36415 COLL VENOUS BLD VENIPUNCTURE: CPT

## 2023-06-28 PROCEDURE — 86146 BETA-2 GLYCOPROTEIN ANTIBODY: CPT | Mod: 91

## 2023-06-28 PROCEDURE — 85613 RUSSELL VIPER VENOM DILUTED: CPT

## 2023-06-28 PROCEDURE — 86148 ANTI-PHOSPHOLIPID ANTIBODY: CPT | Mod: 91

## 2023-06-28 PROCEDURE — 86147 CARDIOLIPIN ANTIBODY EA IG: CPT

## 2023-06-28 PROCEDURE — 85610 PROTHROMBIN TIME: CPT

## 2023-06-28 PROCEDURE — 85730 THROMBOPLASTIN TIME PARTIAL: CPT

## 2023-06-28 PROCEDURE — 85520 HEPARIN ASSAY: CPT

## 2023-06-30 LAB
APTT PPP: 28 SEC (ref 24.7–36)
B2 GLYCOPROT1 IGA SER-ACNC: <10 SAU
B2 GLYCOPROT1 IGG SERPL IA-ACNC: <10 SGU
B2 GLYCOPROT1 IGM SERPL IA-ACNC: <10 SMU
CARDIOLIPIN IGA SER IA-ACNC: <10 APL
CARDIOLIPIN IGG SER IA-ACNC: <10 GPL
CARDIOLIPIN IGM SER IA-ACNC: <10 MPL
INR PPP: 0.99 (ref 0.87–1.13)
LA PPP-IMP: NORMAL
LMWH PPP CHRO-ACNC: <0.1 U/ML
PROTHROMBIN TIME: 13 SEC (ref 12–14.6)
PS IGA SER IA-ACNC: 0 APS (ref 0–19)
PS IGG SER IA-ACNC: 3 GPS (ref 0–15)
PS IGM SER IA-ACNC: 1 MPS (ref 0–21)
SCREEN DRVVT: 35.4 SEC (ref 28–48)

## 2023-09-15 ENCOUNTER — HOSPITAL ENCOUNTER (OUTPATIENT)
Facility: MEDICAL CENTER | Age: 48
End: 2023-09-15
Attending: OBSTETRICS & GYNECOLOGY
Payer: COMMERCIAL

## 2023-09-15 LAB
PROLACTIN SERPL-MCNC: 18.2 NG/ML (ref 2.8–26)
TSH SERPL DL<=0.005 MIU/L-ACNC: 1.91 UIU/ML (ref 0.38–5.33)

## 2023-09-15 PROCEDURE — 84443 ASSAY THYROID STIM HORMONE: CPT

## 2023-09-15 PROCEDURE — 84146 ASSAY OF PROLACTIN: CPT

## 2023-10-18 ENCOUNTER — PATIENT MESSAGE (OUTPATIENT)
Dept: MEDICAL GROUP | Facility: LAB | Age: 48
End: 2023-10-18
Payer: COMMERCIAL

## 2023-10-18 DIAGNOSIS — O09.511 PRIMIGRAVIDA OF ADVANCED MATERNAL AGE IN FIRST TRIMESTER: ICD-10-CM

## 2023-10-25 DIAGNOSIS — E55.9 VITAMIN D DEFICIENCY: ICD-10-CM

## 2023-10-25 RX ORDER — ERGOCALCIFEROL 1.25 MG/1
CAPSULE ORAL
Qty: 12 CAPSULE | Refills: 0 | Status: SHIPPED | OUTPATIENT
Start: 2023-10-25 | End: 2023-10-30

## 2023-10-25 NOTE — TELEPHONE ENCOUNTER
Received request via: Pharmacy    Was the patient seen in the last year in this department? No- pt informed to make an appt  6/16/22  Does the patient have an active prescription (recently filled or refills available) for medication(s) requested? No    Does the patient have [facility] Plus and need 100 day supply (blood pressure, diabetes and cholesterol meds only)? Medication is not for cholesterol, blood pressure or diabetes

## 2023-10-30 ENCOUNTER — OFFICE VISIT (OUTPATIENT)
Dept: MEDICAL GROUP | Facility: LAB | Age: 48
End: 2023-10-30
Payer: COMMERCIAL

## 2023-10-30 VITALS
DIASTOLIC BLOOD PRESSURE: 78 MMHG | WEIGHT: 196 LBS | SYSTOLIC BLOOD PRESSURE: 122 MMHG | BODY MASS INDEX: 27.44 KG/M2 | TEMPERATURE: 97.6 F | OXYGEN SATURATION: 98 % | HEIGHT: 71 IN | RESPIRATION RATE: 12 BRPM | HEART RATE: 71 BPM

## 2023-10-30 DIAGNOSIS — Z12.11 COLON CANCER SCREENING: ICD-10-CM

## 2023-10-30 DIAGNOSIS — Z23 NEED FOR VACCINATION: ICD-10-CM

## 2023-10-30 DIAGNOSIS — Z00.00 WELL WOMAN EXAM WITHOUT GYNECOLOGICAL EXAM: ICD-10-CM

## 2023-10-30 DIAGNOSIS — E55.9 VITAMIN D DEFICIENCY: ICD-10-CM

## 2023-10-30 PROCEDURE — 3074F SYST BP LT 130 MM HG: CPT | Performed by: FAMILY MEDICINE

## 2023-10-30 PROCEDURE — 3078F DIAST BP <80 MM HG: CPT | Performed by: FAMILY MEDICINE

## 2023-10-30 PROCEDURE — 99396 PREV VISIT EST AGE 40-64: CPT | Performed by: FAMILY MEDICINE

## 2023-10-30 ASSESSMENT — FIBROSIS 4 INDEX: FIB4 SCORE: 0.56

## 2023-10-30 NOTE — PROGRESS NOTES
"Subjective:     Chief Complaint   Patient presents with    Annual Exam         HPI:   Stacey presents today for annual.   She has been working with fertility clinic and was  pregnant. Miscarried last week. She is high risk based on this, as well as age. Has one more embryo to use upcoming. Following with Dr lane for this.     Diet: healthy in general. No restrictions.   Exercise: Not much, not lifting due to back issues.   Sleep: ok, 8 hours or so.             Current Outpatient Medications Ordered in Epic   Medication Sig Dispense Refill    vitamin D2, Ergocalciferol, (DRISDOL) 1.25 MG (87361 UT) Cap capsule TAKE 1 CAPSULE BY MOUTH ONE TIME PER WEEK 12 Capsule 0    acetaminophen (TYLENOL) 325 MG Tab Take 325 mg by mouth every four hours as needed.      cyclobenzaprine (FLEXERIL) 10 mg Tab Take 1 Tablet by mouth 3 times a day as needed for Moderate Pain or Muscle Spasms. 30 Tablet 0    lidocaine (LIDODERM) 5 % Patch Place 1 Patch on the skin every 24 hours as needed (pain). (Patient not taking: Reported on 1/31/2023) 10 Patch 1    methylPREDNISolone (MEDROL DOSEPAK) 4 MG Tablet Therapy Pack Use as directed (Patient not taking: Reported on 12/1/2022) 1 Each 0    cyclobenzaprine (FLEXERIL) 10 mg Tab Take 1 Tablet by mouth 3 times a day as needed for Muscle Spasms. 10 Tablet 0     No current Epic-ordered facility-administered medications on file.         ROS:  Gen: no fevers/chills, no changes in weight  Eyes: no changes in vision  ENT: no sore throat, no hearing loss, no bloody nose  Pulm: no sob, no cough  CV: no chest pain, no palpitations  GI: no nausea/vomiting, no diarrhea  : no dysuria  MSk: no myalgias  Skin: no rash  Neuro: no headaches, no numbness/tingling  Heme/Lymph: no easy bruising      Objective:     Exam:  /78 (BP Location: Right arm, Patient Position: Sitting, BP Cuff Size: Adult)   Pulse 71   Temp 36.4 °C (97.6 °F)   Resp 12   Ht 1.803 m (5' 11\")   Wt 88.9 kg (196 lb)   SpO2 98%   BMI " 27.34 kg/m²  Body mass index is 27.34 kg/m².    Gen: Alert and oriented, No apparent distress.  Neck: Neck is supple without lymphadenopathy.  Lungs: Normal effort, CTA bilaterally, no wheezes, rhonchi, or rales  CV: Regular rate and rhythm. No murmurs, rubs, or gallops.  Ext: No clubbing, cyanosis, edema.      Assessment & Plan:     48 y.o. female with the following -   1. Well woman exam without gynecological exam  Discussed diet and excise recommendations.  Overall she is doing fairly well.  She did have a pregnancy loss last week.  We did discuss implications of this as well as some mood concerns.  She overall is doing fairly well with it.  She is following with OB/GYN for appropriate measures.  Currently on prednisone so we will wait on her flu shot for now.  We did discuss colonoscopy needs we will go ahead and like to do Cologuard given that she is going to try for pregnancy again fairly soon.  - Lipid Profile; Future  - CBC WITH DIFFERENTIAL; Future  - Comp Metabolic Panel; Future  - HEMOGLOBIN A1C; Future  - TSH WITH REFLEX TO FT4; Future    2. Vitamin D deficiency    - VITAMIN D,25 HYDROXY (DEFICIENCY); Future        No follow-ups on file.    Please note that this dictation was created using voice recognition software. I have made every reasonable attempt to correct obvious errors, but I expect that there are errors of grammar and possibly content that I did not discover before finalizing the note.

## 2023-11-17 ENCOUNTER — APPOINTMENT (OUTPATIENT)
Dept: LAB | Facility: MEDICAL CENTER | Age: 48
End: 2023-11-17
Attending: FAMILY MEDICINE
Payer: COMMERCIAL

## 2023-12-21 ENCOUNTER — HOSPITAL ENCOUNTER (OUTPATIENT)
Dept: LAB | Facility: MEDICAL CENTER | Age: 48
End: 2023-12-21
Attending: FAMILY MEDICINE
Payer: COMMERCIAL

## 2023-12-21 DIAGNOSIS — Z00.00 WELL WOMAN EXAM WITHOUT GYNECOLOGICAL EXAM: ICD-10-CM

## 2023-12-21 DIAGNOSIS — E55.9 VITAMIN D DEFICIENCY: ICD-10-CM

## 2023-12-21 LAB
25(OH)D3 SERPL-MCNC: 23 NG/ML (ref 30–100)
ALBUMIN SERPL BCP-MCNC: 4.2 G/DL (ref 3.2–4.9)
ALBUMIN/GLOB SERPL: 1.6 G/DL
ALP SERPL-CCNC: 64 U/L (ref 30–99)
ALT SERPL-CCNC: 41 U/L (ref 2–50)
ANION GAP SERPL CALC-SCNC: 9 MMOL/L (ref 7–16)
AST SERPL-CCNC: 23 U/L (ref 12–45)
BASOPHILS # BLD AUTO: 0.7 % (ref 0–1.8)
BASOPHILS # BLD: 0.05 K/UL (ref 0–0.12)
BILIRUB SERPL-MCNC: 0.5 MG/DL (ref 0.1–1.5)
BUN SERPL-MCNC: 11 MG/DL (ref 8–22)
CALCIUM ALBUM COR SERPL-MCNC: 8.5 MG/DL (ref 8.5–10.5)
CALCIUM SERPL-MCNC: 8.7 MG/DL (ref 8.4–10.2)
CHLORIDE SERPL-SCNC: 107 MMOL/L (ref 96–112)
CHOLEST SERPL-MCNC: 156 MG/DL (ref 100–199)
CO2 SERPL-SCNC: 21 MMOL/L (ref 20–33)
CREAT SERPL-MCNC: 0.6 MG/DL (ref 0.5–1.4)
EOSINOPHIL # BLD AUTO: 0.23 K/UL (ref 0–0.51)
EOSINOPHIL NFR BLD: 3.4 % (ref 0–6.9)
ERYTHROCYTE [DISTWIDTH] IN BLOOD BY AUTOMATED COUNT: 40.5 FL (ref 35.9–50)
EST. AVERAGE GLUCOSE BLD GHB EST-MCNC: 100 MG/DL
FASTING STATUS PATIENT QL REPORTED: NORMAL
GFR SERPLBLD CREATININE-BSD FMLA CKD-EPI: 112 ML/MIN/1.73 M 2
GLOBULIN SER CALC-MCNC: 2.6 G/DL (ref 1.9–3.5)
GLUCOSE SERPL-MCNC: 99 MG/DL (ref 65–99)
HBA1C MFR BLD: 5.1 % (ref 4–5.6)
HCT VFR BLD AUTO: 39.3 % (ref 37–47)
HDLC SERPL-MCNC: 59 MG/DL
HGB BLD-MCNC: 13.6 G/DL (ref 12–16)
IMM GRANULOCYTES # BLD AUTO: 0.02 K/UL (ref 0–0.11)
IMM GRANULOCYTES NFR BLD AUTO: 0.3 % (ref 0–0.9)
LDLC SERPL CALC-MCNC: 79 MG/DL
LYMPHOCYTES # BLD AUTO: 1.7 K/UL (ref 1–4.8)
LYMPHOCYTES NFR BLD: 24.9 % (ref 22–41)
MCH RBC QN AUTO: 31.1 PG (ref 27–33)
MCHC RBC AUTO-ENTMCNC: 34.6 G/DL (ref 32.2–35.5)
MCV RBC AUTO: 89.7 FL (ref 81.4–97.8)
MONOCYTES # BLD AUTO: 0.45 K/UL (ref 0–0.85)
MONOCYTES NFR BLD AUTO: 6.6 % (ref 0–13.4)
NEUTROPHILS # BLD AUTO: 4.38 K/UL (ref 1.82–7.42)
NEUTROPHILS NFR BLD: 64.1 % (ref 44–72)
NRBC # BLD AUTO: 0 K/UL
NRBC BLD-RTO: 0 /100 WBC (ref 0–0.2)
PLATELET # BLD AUTO: 238 K/UL (ref 164–446)
PMV BLD AUTO: 12.2 FL (ref 9–12.9)
POTASSIUM SERPL-SCNC: 4.2 MMOL/L (ref 3.6–5.5)
PROT SERPL-MCNC: 6.8 G/DL (ref 6–8.2)
RBC # BLD AUTO: 4.38 M/UL (ref 4.2–5.4)
SODIUM SERPL-SCNC: 137 MMOL/L (ref 135–145)
TRIGL SERPL-MCNC: 90 MG/DL (ref 0–149)
TSH SERPL DL<=0.005 MIU/L-ACNC: 1.3 UIU/ML (ref 0.38–5.33)
WBC # BLD AUTO: 6.8 K/UL (ref 4.8–10.8)

## 2023-12-21 PROCEDURE — 36415 COLL VENOUS BLD VENIPUNCTURE: CPT

## 2023-12-21 PROCEDURE — 83036 HEMOGLOBIN GLYCOSYLATED A1C: CPT

## 2023-12-21 PROCEDURE — 80061 LIPID PANEL: CPT

## 2023-12-21 PROCEDURE — 85025 COMPLETE CBC W/AUTO DIFF WBC: CPT

## 2023-12-21 PROCEDURE — 80053 COMPREHEN METABOLIC PANEL: CPT

## 2023-12-21 PROCEDURE — 82306 VITAMIN D 25 HYDROXY: CPT

## 2023-12-21 PROCEDURE — 84443 ASSAY THYROID STIM HORMONE: CPT

## 2024-03-07 ENCOUNTER — HOSPITAL ENCOUNTER (OUTPATIENT)
Dept: LAB | Facility: MEDICAL CENTER | Age: 49
End: 2024-03-07
Attending: OBSTETRICS & GYNECOLOGY
Payer: COMMERCIAL

## 2024-03-07 LAB
PROLACTIN SERPL-MCNC: 24.7 NG/ML (ref 2.8–26)
TSH SERPL DL<=0.005 MIU/L-ACNC: 1.67 UIU/ML (ref 0.38–5.33)

## 2024-03-07 PROCEDURE — 84146 ASSAY OF PROLACTIN: CPT

## 2024-03-07 PROCEDURE — 84443 ASSAY THYROID STIM HORMONE: CPT

## 2024-03-07 PROCEDURE — 36415 COLL VENOUS BLD VENIPUNCTURE: CPT

## 2024-06-01 ENCOUNTER — APPOINTMENT (OUTPATIENT)
Dept: LAB | Facility: MEDICAL CENTER | Age: 49
End: 2024-06-01
Payer: COMMERCIAL

## 2024-06-01 LAB
ABO GROUP BLD: NORMAL
BASOPHILS # BLD AUTO: 0.4 % (ref 0–1.8)
BASOPHILS # BLD: 0.04 K/UL (ref 0–0.12)
BLD GP AB SCN SERPL QL: NORMAL
EOSINOPHIL # BLD AUTO: 0.14 K/UL (ref 0–0.51)
EOSINOPHIL NFR BLD: 1.3 % (ref 0–6.9)
ERYTHROCYTE [DISTWIDTH] IN BLOOD BY AUTOMATED COUNT: 43.8 FL (ref 35.9–50)
HBV SURFACE AG SER QL: ABNORMAL
HCT VFR BLD AUTO: 36.3 % (ref 37–47)
HCV AB SER QL: NORMAL
HGB BLD-MCNC: 12.4 G/DL (ref 12–16)
HIV 1+2 AB+HIV1 P24 AG SERPL QL IA: NORMAL
IMM GRANULOCYTES # BLD AUTO: 0.06 K/UL (ref 0–0.11)
IMM GRANULOCYTES NFR BLD AUTO: 0.6 % (ref 0–0.9)
LYMPHOCYTES # BLD AUTO: 2.11 K/UL (ref 1–4.8)
LYMPHOCYTES NFR BLD: 19.5 % (ref 22–41)
MCH RBC QN AUTO: 31.4 PG (ref 27–33)
MCHC RBC AUTO-ENTMCNC: 34.2 G/DL (ref 32.2–35.5)
MCV RBC AUTO: 91.9 FL (ref 81.4–97.8)
MONOCYTES # BLD AUTO: 0.67 K/UL (ref 0–0.85)
MONOCYTES NFR BLD AUTO: 6.2 % (ref 0–13.4)
NEUTROPHILS # BLD AUTO: 7.82 K/UL (ref 1.82–7.42)
NEUTROPHILS NFR BLD: 72 % (ref 44–72)
NRBC # BLD AUTO: 0 K/UL
NRBC BLD-RTO: 0 /100 WBC (ref 0–0.2)
PLATELET # BLD AUTO: 254 K/UL (ref 164–446)
PMV BLD AUTO: 11.6 FL (ref 9–12.9)
RBC # BLD AUTO: 3.95 M/UL (ref 4.2–5.4)
RH BLD: NORMAL
RUBV AB SER QL: 102 IU/ML
T PALLIDUM AB SER QL IA: ABNORMAL
WBC # BLD AUTO: 10.8 K/UL (ref 4.8–10.8)

## 2024-06-01 PROCEDURE — 86780 TREPONEMA PALLIDUM: CPT

## 2024-06-01 PROCEDURE — 86850 RBC ANTIBODY SCREEN: CPT

## 2024-06-01 PROCEDURE — 86803 HEPATITIS C AB TEST: CPT

## 2024-06-01 PROCEDURE — 86901 BLOOD TYPING SEROLOGIC RH(D): CPT

## 2024-06-01 PROCEDURE — 87340 HEPATITIS B SURFACE AG IA: CPT

## 2024-06-01 PROCEDURE — 87389 HIV-1 AG W/HIV-1&-2 AB AG IA: CPT

## 2024-06-01 PROCEDURE — 87077 CULTURE AEROBIC IDENTIFY: CPT

## 2024-06-01 PROCEDURE — 85025 COMPLETE CBC W/AUTO DIFF WBC: CPT

## 2024-06-01 PROCEDURE — 86762 RUBELLA ANTIBODY: CPT

## 2024-06-01 PROCEDURE — 86900 BLOOD TYPING SEROLOGIC ABO: CPT

## 2024-06-01 PROCEDURE — 87086 URINE CULTURE/COLONY COUNT: CPT

## 2024-06-01 PROCEDURE — 36415 COLL VENOUS BLD VENIPUNCTURE: CPT

## 2024-06-03 LAB
BACTERIA UR CULT: ABNORMAL
BACTERIA UR CULT: ABNORMAL
SIGNIFICANT IND 70042: ABNORMAL
SITE SITE: ABNORMAL
SOURCE SOURCE: ABNORMAL

## 2024-09-10 ENCOUNTER — HOSPITAL ENCOUNTER (OUTPATIENT)
Dept: LAB | Facility: MEDICAL CENTER | Age: 49
End: 2024-09-10
Attending: OBSTETRICS & GYNECOLOGY
Payer: COMMERCIAL

## 2024-09-10 LAB
ERYTHROCYTE [DISTWIDTH] IN BLOOD BY AUTOMATED COUNT: 39.7 FL (ref 35.9–50)
GLUCOSE 1H P 50 G GLC PO SERPL-MCNC: 136 MG/DL (ref 70–139)
HCT VFR BLD AUTO: 35 % (ref 37–47)
HGB BLD-MCNC: 12 G/DL (ref 12–16)
MCH RBC QN AUTO: 31.2 PG (ref 27–33)
MCHC RBC AUTO-ENTMCNC: 34.3 G/DL (ref 32.2–35.5)
MCV RBC AUTO: 90.9 FL (ref 81.4–97.8)
PLATELET # BLD AUTO: 220 K/UL (ref 164–446)
PMV BLD AUTO: 11.7 FL (ref 9–12.9)
RBC # BLD AUTO: 3.85 M/UL (ref 4.2–5.4)
T PALLIDUM AB SER QL IA: NORMAL
WBC # BLD AUTO: 13.7 K/UL (ref 4.8–10.8)

## 2024-09-10 PROCEDURE — 36415 COLL VENOUS BLD VENIPUNCTURE: CPT

## 2024-09-10 PROCEDURE — 86780 TREPONEMA PALLIDUM: CPT

## 2024-09-10 PROCEDURE — 85027 COMPLETE CBC AUTOMATED: CPT

## 2024-09-10 PROCEDURE — 82950 GLUCOSE TEST: CPT

## 2025-01-04 ENCOUNTER — OFFICE VISIT (OUTPATIENT)
Dept: URGENT CARE | Facility: CLINIC | Age: 50
End: 2025-01-04
Payer: COMMERCIAL

## 2025-01-04 VITALS
SYSTOLIC BLOOD PRESSURE: 98 MMHG | OXYGEN SATURATION: 99 % | BODY MASS INDEX: 27.72 KG/M2 | HEART RATE: 84 BPM | RESPIRATION RATE: 10 BRPM | TEMPERATURE: 98 F | HEIGHT: 71 IN | DIASTOLIC BLOOD PRESSURE: 60 MMHG | WEIGHT: 198 LBS

## 2025-01-04 DIAGNOSIS — D18.00 GLOMUS TUMOR: ICD-10-CM

## 2025-01-04 PROCEDURE — 3074F SYST BP LT 130 MM HG: CPT | Performed by: STUDENT IN AN ORGANIZED HEALTH CARE EDUCATION/TRAINING PROGRAM

## 2025-01-04 PROCEDURE — 3078F DIAST BP <80 MM HG: CPT | Performed by: STUDENT IN AN ORGANIZED HEALTH CARE EDUCATION/TRAINING PROGRAM

## 2025-01-04 PROCEDURE — 99213 OFFICE O/P EST LOW 20 MIN: CPT | Performed by: STUDENT IN AN ORGANIZED HEALTH CARE EDUCATION/TRAINING PROGRAM

## 2025-01-04 RX ORDER — IBUPROFEN 600 MG/1
600 TABLET, FILM COATED ORAL EVERY 6 HOURS PRN
COMMUNITY

## 2025-01-04 RX ORDER — PRENATAL WITH FERROUS FUM AND FOLIC ACID 3080; 920; 120; 400; 22; 1.84; 3; 20; 10; 1; 12; 200; 27; 25; 2 [IU]/1; [IU]/1; MG/1; [IU]/1; MG/1; MG/1; MG/1; MG/1; MG/1; MG/1; UG/1; MG/1; MG/1; MG/1; MG/1
1 TABLET ORAL DAILY
COMMUNITY

## 2025-01-04 ASSESSMENT — FIBROSIS 4 INDEX: FIB4 SCORE: 0.95

## 2025-01-04 NOTE — PROGRESS NOTES
Subjective:   CHIEF COMPLAINT  Chief Complaint   Patient presents with    Blister     LEFT HAND, RING FINGER X4 WEEKS NOW, BLEEDING STARTED THIS WEEK       HPI  Stacey Dupont is a 49 y.o. female who presents with a chief complaint of a bleeding lesion along the ulnar aspect of her left ring finger at the distal phalanx.  Says the lesion started approximately 4 weeks ago as a small bump.  She is here today in urgent care due to the persistent bleeding.  Reports she is not experiencing any pain.  There is no trauma.  She is not on blood thinners.  No history of similar lesions.  Currently breast-feeding and had a baby approximately 4 weeks ago.    REVIEW OF SYSTEMS  General: no fever or chills  GI: no nausea or vomiting  See HPI for further details.    PAST MEDICAL HISTORY   has a past medical history of Back pain.    SURGICAL HISTORY   has a past surgical history that includes achilles tendon repair and appendectomy (2007).    ALLERGIES  Allergies   Allergen Reactions    Cat Hair Extract        CURRENT MEDICATIONS  Home Medications       Reviewed by Raheel Montero D.O. (Physician) on 01/04/25 at 1242  Med List Status: <None>     Medication Last Dose Status   amoxicillin-clavulanate (AUGMENTIN) 875-125 MG Tab Taking Active   ibuprofen (MOTRIN) 600 MG Tab Taking Active   Prenatal 27-1 MG Tab Taking Active                    SOCIAL HISTORY  Social History     Tobacco Use    Smoking status: Never    Smokeless tobacco: Never   Vaping Use    Vaping status: Never Used   Substance and Sexual Activity    Alcohol use: Yes     Alcohol/week: 1.2 oz     Types: 2 Glasses of wine per week    Drug use: Not Currently     Types: Oral     Comment: topical as well     Sexual activity: Yes     Partners: Male     Comment: tryign to get pregnant       FAMILY HISTORY  Family History   Problem Relation Age of Onset    Thyroid Mother     Heart Disease Father         CABG x 5    Diabetes Father         prediabetes    Heart Disease  "Paternal Grandfather     Diabetes Paternal Grandfather     Stroke Paternal Grandfather     Cancer Neg Hx           Objective:   PHYSICAL EXAM  VITAL SIGNS: BP 98/60 (BP Location: Left arm, Patient Position: Sitting, BP Cuff Size: Adult)   Pulse 84   Temp 36.7 °C (98 °F) (Temporal)   Resp (!) 10   Ht 1.803 m (5' 11\")   Wt 89.8 kg (198 lb)   SpO2 99%   BMI 27.62 kg/m²     Gen: no acute distress, normal voice  Skin: dry, intact, moist mucosal membranes  Eyes: No conjunctival injection b/l  Neck: Normal range of motion. No meningeal signs.   Lungs: No increased work of breathing.  CTAB w/ symmetric expansion  CV: RRR w/o murmurs or clicks  MSK: Well-circumscribed, erythematous macule without extending erythema or edema.  No tenderness to palpation.  No vesicles.  No tenderness to palpation.  No motor or sensory deficits of the digit.  No nailbed/nail plate involvement.    Assessment/Plan:     1. 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  No secondary subcutaneous infection.  No drainable abscess.  Was hemostatic on presentation but I did roll a silver nitrate Q-tip over the lesion to ensure continued hemostasis.  Patient tolerated well.  I then covered the area with Xeroform, and Coban.  Supplies were provided and if bleeding returns instructed to use Coban as a pressure bandage.    Ordered referral to follow-up with Dr. Burrows (ortho) at [facility]    Return to urgent care any new/worsening symptoms or further questions or concerns.  Patient understood everything discussed.  All questions were answered.        Please note that this dictation was created using voice recognition software. I have made a reasonable attempt to correct obvious errors, but I expect that there are errors of grammar and possibly content that I did not discover before finalizing the note.         "